# Patient Record
Sex: MALE | Race: BLACK OR AFRICAN AMERICAN | NOT HISPANIC OR LATINO
[De-identification: names, ages, dates, MRNs, and addresses within clinical notes are randomized per-mention and may not be internally consistent; named-entity substitution may affect disease eponyms.]

---

## 2017-01-12 ENCOUNTER — APPOINTMENT (OUTPATIENT)
Dept: CARDIOLOGY | Facility: CLINIC | Age: 82
End: 2017-01-12

## 2017-01-12 VITALS
RESPIRATION RATE: 16 BRPM | DIASTOLIC BLOOD PRESSURE: 62 MMHG | SYSTOLIC BLOOD PRESSURE: 122 MMHG | BODY MASS INDEX: 20.66 KG/M2 | HEIGHT: 65 IN | HEART RATE: 62 BPM | WEIGHT: 124 LBS | OXYGEN SATURATION: 98 %

## 2017-01-19 ENCOUNTER — APPOINTMENT (OUTPATIENT)
Dept: VASCULAR SURGERY | Facility: CLINIC | Age: 82
End: 2017-01-19

## 2017-01-19 VITALS
WEIGHT: 124 LBS | SYSTOLIC BLOOD PRESSURE: 132 MMHG | HEIGHT: 65 IN | TEMPERATURE: 97.9 F | HEART RATE: 60 BPM | DIASTOLIC BLOOD PRESSURE: 77 MMHG | BODY MASS INDEX: 20.66 KG/M2

## 2017-01-19 DIAGNOSIS — I50.33 ACUTE ON CHRONIC DIASTOLIC (CONGESTIVE) HEART FAILURE: ICD-10-CM

## 2017-01-31 ENCOUNTER — OUTPATIENT (OUTPATIENT)
Dept: OUTPATIENT SERVICES | Facility: HOSPITAL | Age: 82
LOS: 1 days | End: 2017-01-31
Payer: MEDICARE

## 2017-01-31 VITALS
HEIGHT: 65 IN | WEIGHT: 121.03 LBS | SYSTOLIC BLOOD PRESSURE: 128 MMHG | RESPIRATION RATE: 20 BRPM | DIASTOLIC BLOOD PRESSURE: 60 MMHG | TEMPERATURE: 97 F | HEART RATE: 64 BPM

## 2017-01-31 DIAGNOSIS — I50.9 HEART FAILURE, UNSPECIFIED: ICD-10-CM

## 2017-01-31 DIAGNOSIS — Z98.89 OTHER SPECIFIED POSTPROCEDURAL STATES: Chronic | ICD-10-CM

## 2017-01-31 DIAGNOSIS — N18.9 CHRONIC KIDNEY DISEASE, UNSPECIFIED: Chronic | ICD-10-CM

## 2017-01-31 DIAGNOSIS — N18.4 CHRONIC KIDNEY DISEASE, STAGE 4 (SEVERE): ICD-10-CM

## 2017-01-31 DIAGNOSIS — N18.9 CHRONIC KIDNEY DISEASE, UNSPECIFIED: ICD-10-CM

## 2017-01-31 DIAGNOSIS — R06.83 SNORING: ICD-10-CM

## 2017-01-31 DIAGNOSIS — H26.9 UNSPECIFIED CATARACT: Chronic | ICD-10-CM

## 2017-01-31 LAB
BASOPHILS # BLD AUTO: 0.02 K/UL — SIGNIFICANT CHANGE UP (ref 0–0.2)
BASOPHILS NFR BLD AUTO: 0.5 % — SIGNIFICANT CHANGE UP (ref 0–2)
BUN SERPL-MCNC: 37 MG/DL — HIGH (ref 7–23)
CALCIUM SERPL-MCNC: 8.4 MG/DL — SIGNIFICANT CHANGE UP (ref 8.4–10.5)
CHLORIDE SERPL-SCNC: 96 MMOL/L — LOW (ref 98–107)
CO2 SERPL-SCNC: 26 MMOL/L — SIGNIFICANT CHANGE UP (ref 22–31)
CREAT SERPL-MCNC: 8.23 MG/DL — HIGH (ref 0.5–1.3)
EOSINOPHIL # BLD AUTO: 0.17 K/UL — SIGNIFICANT CHANGE UP (ref 0–0.5)
EOSINOPHIL NFR BLD AUTO: 4.2 % — SIGNIFICANT CHANGE UP (ref 0–6)
GLUCOSE SERPL-MCNC: 95 MG/DL — SIGNIFICANT CHANGE UP (ref 70–99)
HCT VFR BLD CALC: 34.6 % — LOW (ref 39–50)
HGB BLD-MCNC: 10.6 G/DL — LOW (ref 13–17)
IMM GRANULOCYTES NFR BLD AUTO: 0 % — SIGNIFICANT CHANGE UP (ref 0–1.5)
LYMPHOCYTES # BLD AUTO: 0.7 K/UL — LOW (ref 1–3.3)
LYMPHOCYTES # BLD AUTO: 17.5 % — SIGNIFICANT CHANGE UP (ref 13–44)
MCHC RBC-ENTMCNC: 27 PG — SIGNIFICANT CHANGE UP (ref 27–34)
MCHC RBC-ENTMCNC: 30.6 % — LOW (ref 32–36)
MCV RBC AUTO: 88 FL — SIGNIFICANT CHANGE UP (ref 80–100)
MONOCYTES # BLD AUTO: 0.62 K/UL — SIGNIFICANT CHANGE UP (ref 0–0.9)
MONOCYTES NFR BLD AUTO: 15.5 % — HIGH (ref 2–14)
NEUTROPHILS # BLD AUTO: 2.5 K/UL — SIGNIFICANT CHANGE UP (ref 1.8–7.4)
NEUTROPHILS NFR BLD AUTO: 62.3 % — SIGNIFICANT CHANGE UP (ref 43–77)
PLATELET # BLD AUTO: 141 K/UL — LOW (ref 150–400)
PMV BLD: 10.7 FL — SIGNIFICANT CHANGE UP (ref 7–13)
POTASSIUM SERPL-MCNC: 4.1 MMOL/L — SIGNIFICANT CHANGE UP (ref 3.5–5.3)
POTASSIUM SERPL-SCNC: 4.1 MMOL/L — SIGNIFICANT CHANGE UP (ref 3.5–5.3)
RBC # BLD: 3.93 M/UL — LOW (ref 4.2–5.8)
RBC # FLD: 18 % — HIGH (ref 10.3–14.5)
SODIUM SERPL-SCNC: 143 MMOL/L — SIGNIFICANT CHANGE UP (ref 135–145)
WBC # BLD: 4.01 K/UL — SIGNIFICANT CHANGE UP (ref 3.8–10.5)
WBC # FLD AUTO: 4.01 K/UL — SIGNIFICANT CHANGE UP (ref 3.8–10.5)

## 2017-01-31 PROCEDURE — 93010 ELECTROCARDIOGRAM REPORT: CPT

## 2017-01-31 RX ORDER — SODIUM CHLORIDE 9 MG/ML
1000 INJECTION INTRAMUSCULAR; INTRAVENOUS; SUBCUTANEOUS
Qty: 0 | Refills: 0 | Status: DISCONTINUED | OUTPATIENT
Start: 2017-02-07 | End: 2017-02-22

## 2017-01-31 NOTE — H&P PST ADULT - NEGATIVE ENMT SYMPTOMS
no vertigo/no hearing difficulty/no tinnitus no tinnitus/no hearing difficulty/no vertigo/no ear pain

## 2017-01-31 NOTE — H&P PST ADULT - PMH
AAA (abdominal aortic aneurysm)    Aortic dissection    BPH (benign prostatic hypertrophy)    CHF (Congestive Heart Failure)    CKD (chronic kidney disease)    Essential Hypertension    Intracranial Subdural Hematoma  1991 - no deficits  Mitral valve disorder    Osteoarthritis  left knee.  Pacemaker  10/2010, BIV ICD upgrade in 9/2015  PHT (Pulmonary Hypertension)    Renal Carcinoma

## 2017-01-31 NOTE — H&P PST ADULT - NSANTHOSAYNRD_GEN_A_CORE
never tested/No. ISMAEL screening performed.  STOP BANG Legend: 0-2 = LOW Risk; 3-4 = INTERMEDIATE Risk; 5-8 = HIGH Risk

## 2017-01-31 NOTE — H&P PST ADULT - PROBLEM SELECTOR PLAN 1
scheduled for superficialization of right AV fistula on 2/7/17.   labs pending  Preop instructions provided to pt.

## 2017-01-31 NOTE — H&P PST ADULT - NEGATIVE OPHTHALMOLOGIC SYMPTOMS
no blurred vision L/no photophobia/no diplopia/no blurred vision R no photophobia/no lacrimation R/no diplopia/no blurred vision R/no lacrimation L/no blurred vision L

## 2017-01-31 NOTE — H&P PST ADULT - RS GEN PE MLT RESP DETAILS PC
airway patent/respirations non-labored/good air movement/breath sounds equal/clear to auscultation bilaterally respirations non-labored/airway patent/no wheezes/good air movement/no rales/clear to auscultation bilaterally/no rhonchi/breath sounds equal

## 2017-01-31 NOTE — H&P PST ADULT - LYMPHATIC
supraclavicular L/posterior cervical R/anterior cervical R/posterior cervical L/supraclavicular R/anterior cervical L

## 2017-01-31 NOTE — H&P PST ADULT - PSH
Cataract, bilateral  right 3/2015, right  5/2015  Chronic kidney disease (CKD)  pt s/p Right AVF, s/p Right First stage basilic vein transposition  Chronic kidney disease (CKD)  s/p Right AVF 2/16, 4/16  H/O aortic aneurysm repair  2013  History of cystoscopy  cryoablation  of prostate 2014  Mitral valve replaced  Bioprosthetic - Dr. Roy 2010  Pacemaker  2010 [employer] model #2210, with upgrade to AICD in 2015 [employer] model #[id number]  S/P Craniotomy  1991  S/P Nephrectomy  right partial nephrectomy 2006 Cataract, bilateral  right 3/2015, right  5/2015  Chronic kidney disease (CKD)  s/p Right AVF 2/16, 4/16  Chronic kidney disease (CKD)  pt s/p Right AVF, s/p Right First stage basilic vein transposition  CRF (chronic renal failure)  AV fistula right upper arm inserted in 2016 May  H/O aortic aneurysm repair  2013  History of cystoscopy  cryoablation  of prostate 2014  Mitral valve replaced  Bioprosthetic - Dr. Roy 2010  Pacemaker  2010 [employer] model #2210, with upgrade to AICD in 2015 [employer] model #[id number]  S/P Craniotomy  1991  S/P Nephrectomy  right partial nephrectomy 2006

## 2017-02-06 NOTE — ASU PATIENT PROFILE, ADULT - PSH
Cataract, bilateral  right 3/2015, right  5/2015  Chronic kidney disease (CKD)  pt s/p Right AVF, s/p Right First stage basilic vein transposition  Chronic kidney disease (CKD)  s/p Right AVF 2/16, 4/16  H/O aortic aneurysm repair  2013  History of cystoscopy  cryoablation  of prostate 2014  Mitral valve replaced  Bioprosthetic - Dr. Roy 2010  Pacemaker  2010 [employer] model #2210, with upgrade to AICD in 2015 [employer] model #[id number]  S/P Craniotomy  1991  S/P Nephrectomy  right partial nephrectomy 2006

## 2017-02-06 NOTE — ASU PATIENT PROFILE, ADULT - VISION (WITH CORRECTIVE LENSES IF THE PATIENT USUALLY WEARS THEM):
Normal vision: sees adequately in most situations; can see medication labels, newsprint reading glasses/Normal vision: sees adequately in most situations; can see medication labels, newsprint

## 2017-02-07 ENCOUNTER — OUTPATIENT (OUTPATIENT)
Dept: OUTPATIENT SERVICES | Facility: HOSPITAL | Age: 82
LOS: 1 days | Discharge: HOME | End: 2017-02-07
Payer: MEDICARE

## 2017-02-07 ENCOUNTER — APPOINTMENT (OUTPATIENT)
Dept: VASCULAR SURGERY | Facility: HOSPITAL | Age: 82
End: 2017-02-07

## 2017-02-07 VITALS
SYSTOLIC BLOOD PRESSURE: 127 MMHG | OXYGEN SATURATION: 96 % | HEART RATE: 61 BPM | DIASTOLIC BLOOD PRESSURE: 58 MMHG | WEIGHT: 121.03 LBS | TEMPERATURE: 98 F | RESPIRATION RATE: 16 BRPM | HEIGHT: 65 IN

## 2017-02-07 VITALS
TEMPERATURE: 97 F | OXYGEN SATURATION: 95 % | HEART RATE: 61 BPM | DIASTOLIC BLOOD PRESSURE: 70 MMHG | RESPIRATION RATE: 16 BRPM | SYSTOLIC BLOOD PRESSURE: 126 MMHG

## 2017-02-07 DIAGNOSIS — N18.9 CHRONIC KIDNEY DISEASE, UNSPECIFIED: Chronic | ICD-10-CM

## 2017-02-07 DIAGNOSIS — H26.9 UNSPECIFIED CATARACT: Chronic | ICD-10-CM

## 2017-02-07 DIAGNOSIS — Z98.89 OTHER SPECIFIED POSTPROCEDURAL STATES: Chronic | ICD-10-CM

## 2017-02-07 DIAGNOSIS — N18.4 CHRONIC KIDNEY DISEASE, STAGE 4 (SEVERE): ICD-10-CM

## 2017-02-07 LAB — POTASSIUM BLDV-SCNC: 4.3 MMOL/L — SIGNIFICANT CHANGE UP (ref 3.4–4.5)

## 2017-02-07 PROCEDURE — 36832 AV FISTULA REVISION OPEN: CPT

## 2017-02-07 PROCEDURE — 93287 PERI-PX DEVICE EVAL & PRGR: CPT | Mod: 26

## 2017-02-07 RX ORDER — ACETAMINOPHEN 500 MG
650 TABLET ORAL EVERY 6 HOURS
Qty: 0 | Refills: 0 | Status: DISCONTINUED | OUTPATIENT
Start: 2017-02-07 | End: 2017-02-22

## 2017-02-07 RX ORDER — ACETAMINOPHEN 500 MG
2 TABLET ORAL
Qty: 0 | Refills: 0 | COMMUNITY
Start: 2017-02-07

## 2017-02-07 NOTE — ASU DISCHARGE PLAN (ADULT/PEDIATRIC). - MEDICATION SUMMARY - MEDICATIONS TO TAKE
I will START or STAY ON the medications listed below when I get home from the hospital:    MVI 1  tab orally daily  last dose 1/30  --     -- Indication: For home med    Vitamin B 100 mg orally daily  --     -- Indication: For home med    Tumeric 1 tab orally daily  --     -- Indication: For home med    acetaminophen 325 mg oral tablet  -- 2 tab(s) by mouth every 6 hours, As needed, Moderate Pain (4 - 6)  -- Indication: For pain    aspirin 81 mg oral tablet  -- 1 tab(s) by mouth once a day in  am  -- Indication: For home med    labetalol 200 mg oral tablet  -- 1 tab(s) by mouth 2 times a day  -- Indication: For home med

## 2017-02-07 NOTE — ASU DISCHARGE PLAN (ADULT/PEDIATRIC). - NOTIFY
Bleeding that does not stop Fever greater than 101/Inability to Tolerate Liquids or Foods/Persistent Nausea and Vomiting/Bleeding that does not stop

## 2017-02-07 NOTE — ASU DISCHARGE PLAN (ADULT/PEDIATRIC). - CONDITIONS AT DISCHARGE
Verbalizing good understanding of discharge instructions and medication review.Discharge criteria met.  Cleared for discharge home by anesthesia.  Escorted to entrance  discharged home.

## 2017-02-08 ENCOUNTER — EMERGENCY (EMERGENCY)
Facility: HOSPITAL | Age: 82
LOS: 1 days | Discharge: HOME | End: 2017-02-08
Attending: EMERGENCY MEDICINE | Admitting: EMERGENCY MEDICINE
Payer: MEDICARE

## 2017-02-08 VITALS
DIASTOLIC BLOOD PRESSURE: 70 MMHG | OXYGEN SATURATION: 100 % | SYSTOLIC BLOOD PRESSURE: 151 MMHG | HEART RATE: 64 BPM | TEMPERATURE: 99 F | RESPIRATION RATE: 16 BRPM

## 2017-02-08 DIAGNOSIS — N18.9 CHRONIC KIDNEY DISEASE, UNSPECIFIED: Chronic | ICD-10-CM

## 2017-02-08 DIAGNOSIS — Z98.89 OTHER SPECIFIED POSTPROCEDURAL STATES: Chronic | ICD-10-CM

## 2017-02-08 DIAGNOSIS — H26.9 UNSPECIFIED CATARACT: Chronic | ICD-10-CM

## 2017-02-08 PROCEDURE — 99283 EMERGENCY DEPT VISIT LOW MDM: CPT

## 2017-02-08 NOTE — ED ADULT TRIAGE NOTE - CHIEF COMPLAINT QUOTE
C/O post op site bleeding,  HX R AV Fistula since April, Yesterday had a review of the R AV Fistula, dc'd w/ bandage to site - bled last night through bandage then stopped. Went to HD Today and they redressed it.  Pt observed w/ some bleeding on bandage, not saturated. PMH, HTN, Mitral Valve replace, Aortic Dissection, ESRD HD M,W, F - completed HD today, on ASA 81mg daily. Denies pain or other medical complaint. C/O post op site bleeding,  HX R AV Fistula since April, Yesterday had a review of the R AV Fistula, dc'd w/ bandage to site - bled last night through bandage then stopped. Went to HD Today and they redressed it.  Pt observed w/ some bleeding on bandage, not saturated. PMH, HTN, Mitral Valve replace, Aortic Dissection, ESRD HD M,W, F - completed HD today, on ASA 81mg daily. Denies pain or other medical complaint.    Addendum - pt c/o bleeding to R AVFistula again - bandages observed w/ increased fresh blood. Observed small opening to suture line, small amt active bleeding present, pressure dressing applied, pt denies pain, in NAD at this time.

## 2017-02-09 VITALS
SYSTOLIC BLOOD PRESSURE: 142 MMHG | RESPIRATION RATE: 14 BRPM | HEART RATE: 60 BPM | DIASTOLIC BLOOD PRESSURE: 55 MMHG | TEMPERATURE: 98 F | OXYGEN SATURATION: 100 %

## 2017-02-09 NOTE — ED PROVIDER NOTE - PHYSICAL EXAMINATION
The fistula site in the LUE has a palpable thrill.  There is also some slow oozing of blood from the proximal portion of the fistula in the region of the sutures.

## 2017-02-09 NOTE — ED PROVIDER NOTE - PSH
Cataract, bilateral  right 3/2015, right  5/2015  Chronic kidney disease (CKD)  s/p Right AVF 2/16, 4/16  Chronic kidney disease (CKD)  pt s/p Right AVF, s/p Right First stage basilic vein transposition  CRF (chronic renal failure)  AV fistula right upper arm inserted in 2016 May  H/O aortic aneurysm repair  2013  History of cystoscopy  cryoablation  of prostate 2014  Mitral valve replaced  Bioprosthetic - Dr. Roy 2010  Pacemaker  2010 [employer] model #2210, with upgrade to AICD in 2015 [employer] model #[id number]  S/P Craniotomy  1991  S/P Nephrectomy  right partial nephrectomy 2006

## 2017-02-09 NOTE — ED PROVIDER NOTE - OBJECTIVE STATEMENT
81 yo M with ESRD with a recent fistula revision by Dr Collazo 1 day ago on the 7th to the proximal portion of the fistula.  There was some post op bleeding last night.  This morning went to HD and they used the fistula (distal portion) for HD and there was some minor bleeding to the proximal portion.  Tonight started bleeding again and soaked dressings and came to the ED.  There is no pain and no signs of symptomatic anemia,  Pt feels well otherwise

## 2017-02-09 NOTE — ED ADULT NURSE NOTE - PSH
Cataract, bilateral  right 3/2015, right  5/2015  Chronic kidney disease (CKD)  s/p Right AVF 2/16, 4/16  Chronic kidney disease (CKD)  pt s/p Right AVF, s/p Right First stage basilic vein transposition  CRF (chronic renal failure)  AV fistula right upper arm inserted in 2016 May  H/O aortic aneurysm repair  2013  History of cystoscopy  cryoablation  of prostate 2014  Mitral valve replaced  Bioprosthetic - Dr. Roy 2010  Pacemaker  2010 [employer] model #2210, with upgrade to AICD in 2015 [employer] model #[id number]  S/P Craniotomy  1991  S/P Nephrectomy  right partial nephrectomy 2006

## 2017-02-09 NOTE — ED PROVIDER NOTE - PROGRESS NOTE DETAILS
surgery consulted to see patient Surgery evaluated the patient placed pressure dressing will reevaluate in 30 minutes. No massive arterial bleed.  Persistent oozing.  Treat symptomatically and reassess.

## 2017-02-09 NOTE — ED PROVIDER NOTE - MEDICAL DECISION MAKING DETAILS
pt presents with bleeding post op.  Slow oozing.  Surgery consulted as was done here.  They will stitch and surgicel and can follow up outpatient.  No signs of anemia and not significant bleeding so no labs at this time

## 2017-02-09 NOTE — ED ADULT NURSE NOTE - CHIEF COMPLAINT QUOTE
C/O post op site bleeding,  HX R AV Fistula since April, Yesterday had a review of the R AV Fistula, dc'd w/ bandage to site - bled last night through bandage then stopped. Went to HD Today and they redressed it.  Pt observed w/ some bleeding on bandage, not saturated. PMH, HTN, Mitral Valve replace, Aortic Dissection, ESRD HD M,W, F - completed HD today, on ASA 81mg daily. Denies pain or other medical complaint.    Addendum - pt c/o bleeding to R AVFistula again - bandages observed w/ increased fresh blood. Observed small opening to suture line, small amt active bleeding present, pressure dressing applied, pt denies pain, in NAD at this time.

## 2017-02-10 NOTE — ED ADULT NURSE NOTE - OBJECTIVE STATEMENT
Pt received to room1 A&Ox3 c/o bleeding from R AV fistula site since last night. Pt states fistula revision X1day ago- had post-op bleeding last night. Pt went for dialysis today, distal part of fistula was accessed with some bleeding noted from proximal portion of fistula after dialysis and again tonight. Pt soaked through two gauze dressings tonight. Dressing removed and minor active bleeding noted to site. MD at bedside, new pressure dressing applied. Pt appears comfortably in stretcher, respirations even and unlabored, nad noted at this time. Denies dizziness, lightheadedness, chest pain, SOB, n/v at this time. PMH anemia. Will monitor patient closely. Have Your Skin Lesions Been Treated?: not been treated Is This A New Presentation, Or A Follow-Up?: Skin Lesions How Severe Is Your Skin Lesion?: mild

## 2017-02-15 ENCOUNTER — TRANSCRIPTION ENCOUNTER (OUTPATIENT)
Age: 82
End: 2017-02-15

## 2017-02-16 ENCOUNTER — APPOINTMENT (OUTPATIENT)
Dept: VASCULAR SURGERY | Facility: CLINIC | Age: 82
End: 2017-02-16

## 2017-02-16 VITALS
BODY MASS INDEX: 20.66 KG/M2 | WEIGHT: 124 LBS | HEART RATE: 64 BPM | DIASTOLIC BLOOD PRESSURE: 58 MMHG | TEMPERATURE: 98.3 F | HEIGHT: 65 IN | SYSTOLIC BLOOD PRESSURE: 134 MMHG

## 2017-02-23 ENCOUNTER — APPOINTMENT (OUTPATIENT)
Dept: VASCULAR SURGERY | Facility: CLINIC | Age: 82
End: 2017-02-23

## 2017-02-23 VITALS
WEIGHT: 126 LBS | HEIGHT: 65 IN | SYSTOLIC BLOOD PRESSURE: 112 MMHG | TEMPERATURE: 97.9 F | HEART RATE: 66 BPM | DIASTOLIC BLOOD PRESSURE: 63 MMHG | BODY MASS INDEX: 20.99 KG/M2

## 2017-03-07 ENCOUNTER — APPOINTMENT (OUTPATIENT)
Dept: ELECTROPHYSIOLOGY | Facility: CLINIC | Age: 82
End: 2017-03-07

## 2017-03-23 ENCOUNTER — APPOINTMENT (OUTPATIENT)
Dept: VASCULAR SURGERY | Facility: CLINIC | Age: 82
End: 2017-03-23

## 2017-03-23 VITALS
DIASTOLIC BLOOD PRESSURE: 59 MMHG | WEIGHT: 126 LBS | TEMPERATURE: 97.8 F | HEIGHT: 65 IN | SYSTOLIC BLOOD PRESSURE: 124 MMHG | HEART RATE: 60 BPM | BODY MASS INDEX: 20.99 KG/M2

## 2017-03-23 DIAGNOSIS — Z09 ENCOUNTER FOR FOLLOW-UP EXAMINATION AFTER COMPLETED TREATMENT FOR CONDITIONS OTHER THAN MALIGNANT NEOPLASM: ICD-10-CM

## 2017-03-27 PROBLEM — Z09 POSTOP CHECK: Status: ACTIVE | Noted: 2017-02-16

## 2017-04-13 ENCOUNTER — APPOINTMENT (OUTPATIENT)
Dept: CARDIOLOGY | Facility: CLINIC | Age: 82
End: 2017-04-13

## 2017-04-13 ENCOUNTER — NON-APPOINTMENT (OUTPATIENT)
Age: 82
End: 2017-04-13

## 2017-04-13 VITALS
HEART RATE: 64 BPM | OXYGEN SATURATION: 97 % | HEIGHT: 65 IN | RESPIRATION RATE: 18 BRPM | TEMPERATURE: 98.1 F | SYSTOLIC BLOOD PRESSURE: 137 MMHG | DIASTOLIC BLOOD PRESSURE: 73 MMHG | WEIGHT: 126 LBS | BODY MASS INDEX: 20.99 KG/M2

## 2017-04-13 RX ORDER — METOPROLOL TARTRATE 50 MG/1
50 TABLET, FILM COATED ORAL
Qty: 60 | Refills: 0 | Status: DISCONTINUED | COMMUNITY
Start: 2017-01-12 | End: 2017-04-13

## 2017-04-13 RX ORDER — LABETALOL HYDROCHLORIDE 200 MG/1
200 TABLET, FILM COATED ORAL 3 TIMES DAILY
Qty: 90 | Refills: 0 | Status: ACTIVE | COMMUNITY

## 2017-05-23 ENCOUNTER — OUTPATIENT (OUTPATIENT)
Dept: OUTPATIENT SERVICES | Facility: HOSPITAL | Age: 82
LOS: 1 days | Discharge: HOME | End: 2017-05-23
Payer: MEDICARE

## 2017-05-23 ENCOUNTER — APPOINTMENT (OUTPATIENT)
Dept: VASCULAR SURGERY | Facility: HOSPITAL | Age: 82
End: 2017-05-23

## 2017-05-23 DIAGNOSIS — Z98.89 OTHER SPECIFIED POSTPROCEDURAL STATES: Chronic | ICD-10-CM

## 2017-05-23 DIAGNOSIS — N18.9 CHRONIC KIDNEY DISEASE, UNSPECIFIED: Chronic | ICD-10-CM

## 2017-05-23 DIAGNOSIS — N19 UNSPECIFIED KIDNEY FAILURE: ICD-10-CM

## 2017-05-23 DIAGNOSIS — N18.6 END STAGE RENAL DISEASE: ICD-10-CM

## 2017-05-23 DIAGNOSIS — H26.9 UNSPECIFIED CATARACT: Chronic | ICD-10-CM

## 2017-05-23 PROCEDURE — 36902 INTRO CATH DIALYSIS CIRCUIT: CPT | Mod: RT

## 2017-05-23 PROCEDURE — 76937 US GUIDE VASCULAR ACCESS: CPT | Mod: 26

## 2017-05-23 PROCEDURE — 36907 BALO ANGIOP CTR DIALYSIS SEG: CPT | Mod: RT

## 2017-05-23 RX ORDER — LABETALOL HCL 100 MG
1 TABLET ORAL
Qty: 0 | Refills: 0 | COMMUNITY

## 2017-05-23 RX ORDER — SODIUM CHLORIDE 9 MG/ML
3 INJECTION INTRAMUSCULAR; INTRAVENOUS; SUBCUTANEOUS EVERY 8 HOURS
Qty: 0 | Refills: 0 | Status: DISCONTINUED | OUTPATIENT
Start: 2017-05-23 | End: 2017-06-07

## 2017-05-23 RX ORDER — ASPIRIN/CALCIUM CARB/MAGNESIUM 324 MG
1 TABLET ORAL
Qty: 0 | Refills: 0 | COMMUNITY

## 2017-05-23 RX ADMIN — SODIUM CHLORIDE 3 MILLILITER(S): 9 INJECTION INTRAMUSCULAR; INTRAVENOUS; SUBCUTANEOUS at 15:41

## 2017-05-23 NOTE — H&P CARDIOLOGY - FAMILY HISTORY
<<-----Click on this checkbox to enter Family History Family history of CVA     Sibling  Still living? Unknown  Diabetes mellitus, Age at diagnosis: Age Unknown  Hypertension, Age at diagnosis: Age Unknown  Family history of heart failure, Age at diagnosis: Age Unknown  Family history of stroke, Age at diagnosis: Age Unknown  Family history of aortic dissection, Age at diagnosis: Age Unknown

## 2017-05-23 NOTE — H&P CARDIOLOGY - PSH
Cataract, bilateral  right 3/2015, right  5/2015  Chronic kidney disease (CKD)  s/p Right AVF 2/16, 4/16  Chronic kidney disease (CKD)  pt s/p Right AVF, s/p Right First stage basilic vein transposition  CRF (chronic renal failure)  AV fistula right upper arm inserted in 2016 May  H/O aortic aneurysm repair  2013  History of cystoscopy  cryoablation  of prostate 2014  Mitral valve replaced  Bioprosthetic - Dr. Roy 2010  Pacemaker  2010 [employer] model #2210, with upgrade to AICD in 2015 [employer] model #[id number]  S/P Craniotomy  1991  S/P Nephrectomy  right partial nephrectomy 2006

## 2017-05-23 NOTE — H&P CARDIOLOGY - RS GEN PE MLT RESP DETAILS PC
no rales/clear to auscultation bilaterally/good air movement/breath sounds equal/respirations non-labored/airway patent/no wheezes/no rhonchi

## 2017-05-23 NOTE — H&P CARDIOLOGY - HISTORY OF PRESENT ILLNESS
82 year old male with HTN, HD (M/W/F - [facility] [place]) s/p     Superficialization of right arm AVF who presents for repeat right AV     fistulogram.  no complaints

## 2017-06-13 ENCOUNTER — APPOINTMENT (OUTPATIENT)
Dept: ELECTROPHYSIOLOGY | Facility: CLINIC | Age: 82
End: 2017-06-13

## 2017-06-22 ENCOUNTER — FORM ENCOUNTER (OUTPATIENT)
Age: 82
End: 2017-06-22

## 2017-06-23 ENCOUNTER — APPOINTMENT (OUTPATIENT)
Age: 82
End: 2017-06-23

## 2017-07-05 ENCOUNTER — FORM ENCOUNTER (OUTPATIENT)
Age: 82
End: 2017-07-05

## 2017-07-06 ENCOUNTER — APPOINTMENT (OUTPATIENT)
Age: 82
End: 2017-07-06

## 2017-08-03 ENCOUNTER — APPOINTMENT (OUTPATIENT)
Dept: CARDIOLOGY | Facility: CLINIC | Age: 82
End: 2017-08-03

## 2017-08-08 ENCOUNTER — FORM ENCOUNTER (OUTPATIENT)
Age: 82
End: 2017-08-08

## 2017-08-09 ENCOUNTER — APPOINTMENT (OUTPATIENT)
Age: 82
End: 2017-08-09
Payer: MEDICARE

## 2017-08-09 PROCEDURE — 36902Z: CUSTOM

## 2017-08-09 PROCEDURE — 36907Z: CUSTOM | Mod: 59

## 2017-08-10 ENCOUNTER — APPOINTMENT (OUTPATIENT)
Dept: ELECTROPHYSIOLOGY | Facility: CLINIC | Age: 82
End: 2017-08-10
Payer: MEDICARE

## 2017-08-10 ENCOUNTER — NON-APPOINTMENT (OUTPATIENT)
Age: 82
End: 2017-08-10

## 2017-08-10 ENCOUNTER — APPOINTMENT (OUTPATIENT)
Dept: CARDIOLOGY | Facility: CLINIC | Age: 82
End: 2017-08-10
Payer: MEDICARE

## 2017-08-10 VITALS
BODY MASS INDEX: 19.83 KG/M2 | HEART RATE: 66 BPM | OXYGEN SATURATION: 98 % | HEIGHT: 65 IN | DIASTOLIC BLOOD PRESSURE: 58 MMHG | SYSTOLIC BLOOD PRESSURE: 135 MMHG | WEIGHT: 119 LBS

## 2017-08-10 PROCEDURE — 99214 OFFICE O/P EST MOD 30 MIN: CPT

## 2017-08-10 PROCEDURE — 93000 ELECTROCARDIOGRAM COMPLETE: CPT

## 2017-08-10 PROCEDURE — 93284 PRGRMG EVAL IMPLANTABLE DFB: CPT

## 2017-09-12 ENCOUNTER — FORM ENCOUNTER (OUTPATIENT)
Age: 82
End: 2017-09-12

## 2017-09-13 ENCOUNTER — APPOINTMENT (OUTPATIENT)
Age: 82
End: 2017-09-13
Payer: MEDICARE

## 2017-09-13 PROCEDURE — 36902Z: CUSTOM

## 2017-09-13 PROCEDURE — 36907Z: CUSTOM | Mod: 59

## 2017-09-28 ENCOUNTER — APPOINTMENT (OUTPATIENT)
Dept: VASCULAR SURGERY | Facility: CLINIC | Age: 82
End: 2017-09-28
Payer: MEDICARE

## 2017-09-28 VITALS
TEMPERATURE: 98 F | HEIGHT: 65 IN | HEART RATE: 60 BPM | DIASTOLIC BLOOD PRESSURE: 71 MMHG | SYSTOLIC BLOOD PRESSURE: 125 MMHG | WEIGHT: 119 LBS | BODY MASS INDEX: 19.83 KG/M2

## 2017-09-28 DIAGNOSIS — I77.0 ARTERIOVENOUS FISTULA, ACQUIRED: ICD-10-CM

## 2017-09-28 PROCEDURE — 99214 OFFICE O/P EST MOD 30 MIN: CPT

## 2017-10-02 PROBLEM — I77.0 AV FISTULA: Status: ACTIVE | Noted: 2017-03-27

## 2017-10-11 ENCOUNTER — APPOINTMENT (OUTPATIENT)
Age: 82
End: 2017-10-11

## 2017-11-02 ENCOUNTER — NON-APPOINTMENT (OUTPATIENT)
Age: 82
End: 2017-11-02

## 2017-11-02 VITALS
DIASTOLIC BLOOD PRESSURE: 73 MMHG | RESPIRATION RATE: 15 BRPM | TEMPERATURE: 98 F | WEIGHT: 122 LBS | BODY MASS INDEX: 20.33 KG/M2 | HEART RATE: 62 BPM | SYSTOLIC BLOOD PRESSURE: 134 MMHG | OXYGEN SATURATION: 99 % | HEIGHT: 65 IN

## 2017-11-02 RX ORDER — MULTIVITAMIN
TABLET ORAL DAILY
Refills: 0 | Status: ACTIVE | COMMUNITY

## 2017-11-02 RX ORDER — CALCITRIOL 0.5 UG/1
0.5 CAPSULE, LIQUID FILLED ORAL
Refills: 0 | Status: ACTIVE | COMMUNITY

## 2017-11-13 ENCOUNTER — APPOINTMENT (OUTPATIENT)
Dept: ELECTROPHYSIOLOGY | Facility: CLINIC | Age: 82
End: 2017-11-13
Payer: MEDICARE

## 2017-11-13 PROCEDURE — 93296 REM INTERROG EVL PM/IDS: CPT

## 2017-11-13 PROCEDURE — 93295 DEV INTERROG REMOTE 1/2/MLT: CPT

## 2017-11-14 ENCOUNTER — FORM ENCOUNTER (OUTPATIENT)
Age: 82
End: 2017-11-14

## 2017-11-15 ENCOUNTER — APPOINTMENT (OUTPATIENT)
Age: 82
End: 2017-11-15
Payer: MEDICARE

## 2017-11-15 PROCEDURE — 36908Z: CUSTOM

## 2017-11-15 PROCEDURE — 36215Z: CUSTOM | Mod: 59

## 2017-11-15 PROCEDURE — 36902Z: CUSTOM | Mod: 59

## 2017-11-26 ENCOUNTER — INPATIENT (INPATIENT)
Facility: HOSPITAL | Age: 82
LOS: 2 days | Discharge: HOME | End: 2017-11-29
Attending: SURGERY | Admitting: SURGERY
Payer: MEDICARE

## 2017-11-26 VITALS
HEART RATE: 59 BPM | DIASTOLIC BLOOD PRESSURE: 61 MMHG | SYSTOLIC BLOOD PRESSURE: 170 MMHG | OXYGEN SATURATION: 99 % | RESPIRATION RATE: 18 BRPM | TEMPERATURE: 98 F

## 2017-11-26 DIAGNOSIS — I71.9 AORTIC ANEURYSM OF UNSPECIFIED SITE, WITHOUT RUPTURE: ICD-10-CM

## 2017-11-26 DIAGNOSIS — N18.9 CHRONIC KIDNEY DISEASE, UNSPECIFIED: Chronic | ICD-10-CM

## 2017-11-26 DIAGNOSIS — Z98.89 OTHER SPECIFIED POSTPROCEDURAL STATES: Chronic | ICD-10-CM

## 2017-11-26 DIAGNOSIS — H26.9 UNSPECIFIED CATARACT: Chronic | ICD-10-CM

## 2017-11-26 LAB
ALBUMIN SERPL ELPH-MCNC: 4 G/DL — SIGNIFICANT CHANGE UP (ref 3.3–5)
ALP SERPL-CCNC: 63 U/L — SIGNIFICANT CHANGE UP (ref 40–120)
ALT FLD-CCNC: 4 U/L — SIGNIFICANT CHANGE UP (ref 4–41)
APPEARANCE UR: CLEAR — SIGNIFICANT CHANGE UP
APTT BLD: 27.7 SEC — SIGNIFICANT CHANGE UP (ref 27.5–37.4)
APTT BLD: 28.6 SEC — SIGNIFICANT CHANGE UP (ref 27.5–37.4)
AST SERPL-CCNC: 9 U/L — SIGNIFICANT CHANGE UP (ref 4–40)
BASOPHILS # BLD AUTO: 0.01 K/UL — SIGNIFICANT CHANGE UP (ref 0–0.2)
BASOPHILS # BLD AUTO: 0.01 K/UL — SIGNIFICANT CHANGE UP (ref 0–0.2)
BASOPHILS NFR BLD AUTO: 0.2 % — SIGNIFICANT CHANGE UP (ref 0–2)
BASOPHILS NFR BLD AUTO: 0.3 % — SIGNIFICANT CHANGE UP (ref 0–2)
BILIRUB SERPL-MCNC: 0.8 MG/DL — SIGNIFICANT CHANGE UP (ref 0.2–1.2)
BILIRUB UR-MCNC: NEGATIVE — SIGNIFICANT CHANGE UP
BLD GP AB SCN SERPL QL: POSITIVE — SIGNIFICANT CHANGE UP
BLOOD UR QL VISUAL: HIGH
BUN SERPL-MCNC: 49 MG/DL — HIGH (ref 7–23)
BUN SERPL-MCNC: 55 MG/DL — HIGH (ref 7–23)
CALCIUM SERPL-MCNC: 8 MG/DL — LOW (ref 8.4–10.5)
CALCIUM SERPL-MCNC: 8.8 MG/DL — SIGNIFICANT CHANGE UP (ref 8.4–10.5)
CHLORIDE SERPL-SCNC: 91 MMOL/L — LOW (ref 98–107)
CHLORIDE SERPL-SCNC: 93 MMOL/L — LOW (ref 98–107)
CK MB BLD-MCNC: 1 NG/ML — SIGNIFICANT CHANGE UP (ref 1–6.6)
CK MB BLD-MCNC: SIGNIFICANT CHANGE UP (ref 0–2.5)
CK SERPL-CCNC: 54 U/L — SIGNIFICANT CHANGE UP (ref 30–200)
CO2 SERPL-SCNC: 27 MMOL/L — SIGNIFICANT CHANGE UP (ref 22–31)
CO2 SERPL-SCNC: 31 MMOL/L — SIGNIFICANT CHANGE UP (ref 22–31)
COLOR SPEC: YELLOW — SIGNIFICANT CHANGE UP
CREAT SERPL-MCNC: 9.2 MG/DL — HIGH (ref 0.5–1.3)
CREAT SERPL-MCNC: 9.65 MG/DL — HIGH (ref 0.5–1.3)
EOSINOPHIL # BLD AUTO: 0.08 K/UL — SIGNIFICANT CHANGE UP (ref 0–0.5)
EOSINOPHIL # BLD AUTO: 0.1 K/UL — SIGNIFICANT CHANGE UP (ref 0–0.5)
EOSINOPHIL NFR BLD AUTO: 2.1 % — SIGNIFICANT CHANGE UP (ref 0–6)
EOSINOPHIL NFR BLD AUTO: 2.5 % — SIGNIFICANT CHANGE UP (ref 0–6)
GLUCOSE SERPL-MCNC: 114 MG/DL — HIGH (ref 70–99)
GLUCOSE SERPL-MCNC: 94 MG/DL — SIGNIFICANT CHANGE UP (ref 70–99)
GLUCOSE UR-MCNC: NEGATIVE — SIGNIFICANT CHANGE UP
HCT VFR BLD CALC: 23.8 % — LOW (ref 39–50)
HCT VFR BLD CALC: 26.9 % — LOW (ref 39–50)
HGB BLD-MCNC: 7.9 G/DL — LOW (ref 13–17)
HGB BLD-MCNC: 8.7 G/DL — LOW (ref 13–17)
IMM GRANULOCYTES # BLD AUTO: 0.01 # — SIGNIFICANT CHANGE UP
IMM GRANULOCYTES # BLD AUTO: 0.01 # — SIGNIFICANT CHANGE UP
IMM GRANULOCYTES NFR BLD AUTO: 0.2 % — SIGNIFICANT CHANGE UP (ref 0–1.5)
IMM GRANULOCYTES NFR BLD AUTO: 0.3 % — SIGNIFICANT CHANGE UP (ref 0–1.5)
INR BLD: 1.14 — SIGNIFICANT CHANGE UP (ref 0.88–1.17)
INR BLD: 1.14 — SIGNIFICANT CHANGE UP (ref 0.88–1.17)
KETONES UR-MCNC: NEGATIVE — SIGNIFICANT CHANGE UP
LACTATE SERPL-SCNC: 1 MMOL/L — SIGNIFICANT CHANGE UP (ref 0.5–2)
LEUKOCYTE ESTERASE UR-ACNC: HIGH
LYMPHOCYTES # BLD AUTO: 0.51 K/UL — LOW (ref 1–3.3)
LYMPHOCYTES # BLD AUTO: 0.59 K/UL — LOW (ref 1–3.3)
LYMPHOCYTES # BLD AUTO: 13.2 % — SIGNIFICANT CHANGE UP (ref 13–44)
LYMPHOCYTES # BLD AUTO: 14.5 % — SIGNIFICANT CHANGE UP (ref 13–44)
MAGNESIUM SERPL-MCNC: 1.6 MG/DL — SIGNIFICANT CHANGE UP (ref 1.6–2.6)
MCHC RBC-ENTMCNC: 27.4 PG — SIGNIFICANT CHANGE UP (ref 27–34)
MCHC RBC-ENTMCNC: 27.4 PG — SIGNIFICANT CHANGE UP (ref 27–34)
MCHC RBC-ENTMCNC: 32.3 % — SIGNIFICANT CHANGE UP (ref 32–36)
MCHC RBC-ENTMCNC: 33.2 % — SIGNIFICANT CHANGE UP (ref 32–36)
MCV RBC AUTO: 82.6 FL — SIGNIFICANT CHANGE UP (ref 80–100)
MCV RBC AUTO: 84.9 FL — SIGNIFICANT CHANGE UP (ref 80–100)
MONOCYTES # BLD AUTO: 0.51 K/UL — SIGNIFICANT CHANGE UP (ref 0–0.9)
MONOCYTES # BLD AUTO: 0.67 K/UL — SIGNIFICANT CHANGE UP (ref 0–0.9)
MONOCYTES NFR BLD AUTO: 13.2 % — SIGNIFICANT CHANGE UP (ref 2–14)
MONOCYTES NFR BLD AUTO: 16.4 % — HIGH (ref 2–14)
NEUTROPHILS # BLD AUTO: 2.7 K/UL — SIGNIFICANT CHANGE UP (ref 1.8–7.4)
NEUTROPHILS # BLD AUTO: 2.75 K/UL — SIGNIFICANT CHANGE UP (ref 1.8–7.4)
NEUTROPHILS NFR BLD AUTO: 66.2 % — SIGNIFICANT CHANGE UP (ref 43–77)
NEUTROPHILS NFR BLD AUTO: 70.9 % — SIGNIFICANT CHANGE UP (ref 43–77)
NITRITE UR-MCNC: NEGATIVE — SIGNIFICANT CHANGE UP
NON-SQ EPI CELLS # UR AUTO: 1 — SIGNIFICANT CHANGE UP
NRBC # FLD: 0 — SIGNIFICANT CHANGE UP
NRBC # FLD: 0 — SIGNIFICANT CHANGE UP
PH UR: 8 — SIGNIFICANT CHANGE UP (ref 4.6–8)
PHOSPHATE SERPL-MCNC: 4.6 MG/DL — HIGH (ref 2.5–4.5)
PLATELET # BLD AUTO: 141 K/UL — LOW (ref 150–400)
PLATELET # BLD AUTO: 145 K/UL — LOW (ref 150–400)
PMV BLD: 10.4 FL — SIGNIFICANT CHANGE UP (ref 7–13)
PMV BLD: 10.6 FL — SIGNIFICANT CHANGE UP (ref 7–13)
POTASSIUM SERPL-MCNC: 5 MMOL/L — SIGNIFICANT CHANGE UP (ref 3.5–5.3)
POTASSIUM SERPL-MCNC: 5.4 MMOL/L — HIGH (ref 3.5–5.3)
POTASSIUM SERPL-SCNC: 5 MMOL/L — SIGNIFICANT CHANGE UP (ref 3.5–5.3)
POTASSIUM SERPL-SCNC: 5.4 MMOL/L — HIGH (ref 3.5–5.3)
PROT SERPL-MCNC: 7 G/DL — SIGNIFICANT CHANGE UP (ref 6–8.3)
PROT UR-MCNC: 100 — SIGNIFICANT CHANGE UP
PROTHROM AB SERPL-ACNC: 12.8 SEC — SIGNIFICANT CHANGE UP (ref 9.8–13.1)
PROTHROM AB SERPL-ACNC: 12.8 SEC — SIGNIFICANT CHANGE UP (ref 9.8–13.1)
RBC # BLD: 2.88 M/UL — LOW (ref 4.2–5.8)
RBC # BLD: 3.17 M/UL — LOW (ref 4.2–5.8)
RBC # FLD: 16.6 % — HIGH (ref 10.3–14.5)
RBC # FLD: 16.9 % — HIGH (ref 10.3–14.5)
RBC CASTS # UR COMP ASSIST: SIGNIFICANT CHANGE UP (ref 0–?)
RH IG SCN BLD-IMP: NEGATIVE — SIGNIFICANT CHANGE UP
SODIUM SERPL-SCNC: 133 MMOL/L — LOW (ref 135–145)
SODIUM SERPL-SCNC: 137 MMOL/L — SIGNIFICANT CHANGE UP (ref 135–145)
SP GR SPEC: 1.01 — SIGNIFICANT CHANGE UP (ref 1–1.03)
SQUAMOUS # UR AUTO: SIGNIFICANT CHANGE UP
TROPONIN T SERPL-MCNC: 0.07 NG/ML — HIGH (ref 0–0.06)
UROBILINOGEN FLD QL: NORMAL E.U. — SIGNIFICANT CHANGE UP (ref 0.1–0.2)
WBC # BLD: 3.87 K/UL — SIGNIFICANT CHANGE UP (ref 3.8–10.5)
WBC # BLD: 4.08 K/UL — SIGNIFICANT CHANGE UP (ref 3.8–10.5)
WBC # FLD AUTO: 3.87 K/UL — SIGNIFICANT CHANGE UP (ref 3.8–10.5)
WBC # FLD AUTO: 4.08 K/UL — SIGNIFICANT CHANGE UP (ref 3.8–10.5)
WBC UR QL: SIGNIFICANT CHANGE UP (ref 0–?)

## 2017-11-26 PROCEDURE — 71275 CT ANGIOGRAPHY CHEST: CPT | Mod: 26

## 2017-11-26 PROCEDURE — 71020: CPT | Mod: 26

## 2017-11-26 PROCEDURE — 74174 CTA ABD&PLVS W/CONTRAST: CPT | Mod: 26

## 2017-11-26 PROCEDURE — 99222 1ST HOSP IP/OBS MODERATE 55: CPT | Mod: GC

## 2017-11-26 RX ORDER — LABETALOL HCL 100 MG
20 TABLET ORAL ONCE
Qty: 0 | Refills: 0 | Status: COMPLETED | OUTPATIENT
Start: 2017-11-26 | End: 2017-11-26

## 2017-11-26 RX ORDER — LABETALOL HCL 100 MG
200 TABLET ORAL ONCE
Qty: 0 | Refills: 0 | Status: COMPLETED | OUTPATIENT
Start: 2017-11-26 | End: 2017-11-26

## 2017-11-26 RX ORDER — ASPIRIN/CALCIUM CARB/MAGNESIUM 324 MG
81 TABLET ORAL DAILY
Qty: 0 | Refills: 0 | Status: DISCONTINUED | OUTPATIENT
Start: 2017-11-26 | End: 2017-11-27

## 2017-11-26 RX ORDER — NICARDIPINE HYDROCHLORIDE 30 MG/1
5 CAPSULE, EXTENDED RELEASE ORAL
Qty: 40 | Refills: 0 | Status: DISCONTINUED | OUTPATIENT
Start: 2017-11-26 | End: 2017-11-27

## 2017-11-26 RX ORDER — LABETALOL HCL 100 MG
20 TABLET ORAL ONCE
Qty: 0 | Refills: 0 | Status: DISCONTINUED | OUTPATIENT
Start: 2017-11-26 | End: 2017-11-26

## 2017-11-26 RX ORDER — LABETALOL HCL 100 MG
10 TABLET ORAL ONCE
Qty: 0 | Refills: 0 | Status: COMPLETED | OUTPATIENT
Start: 2017-11-26 | End: 2017-11-26

## 2017-11-26 RX ORDER — CALCITRIOL 0.5 UG/1
0.25 CAPSULE ORAL DAILY
Qty: 0 | Refills: 0 | Status: DISCONTINUED | OUTPATIENT
Start: 2017-11-26 | End: 2017-11-29

## 2017-11-26 RX ORDER — HYDRALAZINE HCL 50 MG
10 TABLET ORAL ONCE
Qty: 0 | Refills: 0 | Status: COMPLETED | OUTPATIENT
Start: 2017-11-26 | End: 2017-11-26

## 2017-11-26 RX ORDER — HEPARIN SODIUM 5000 [USP'U]/ML
5000 INJECTION INTRAVENOUS; SUBCUTANEOUS EVERY 8 HOURS
Qty: 0 | Refills: 0 | Status: DISCONTINUED | OUTPATIENT
Start: 2017-11-26 | End: 2017-11-27

## 2017-11-26 RX ORDER — LABETALOL HCL 100 MG
2 TABLET ORAL
Qty: 400 | Refills: 0 | Status: DISCONTINUED | OUTPATIENT
Start: 2017-11-26 | End: 2017-11-27

## 2017-11-26 RX ADMIN — HEPARIN SODIUM 5000 UNIT(S): 5000 INJECTION INTRAVENOUS; SUBCUTANEOUS at 22:36

## 2017-11-26 RX ADMIN — Medication 10 MILLIGRAM(S): at 20:12

## 2017-11-26 RX ADMIN — Medication 10 MILLIGRAM(S): at 19:04

## 2017-11-26 RX ADMIN — Medication 200 MILLIGRAM(S): at 16:40

## 2017-11-26 RX ADMIN — Medication 20 MILLIGRAM(S): at 19:40

## 2017-11-26 RX ADMIN — Medication 20 MILLIGRAM(S): at 19:07

## 2017-11-26 RX ADMIN — Medication 120 MG/MIN: at 19:45

## 2017-11-26 RX ADMIN — NICARDIPINE HYDROCHLORIDE 25 MG/HR: 30 CAPSULE, EXTENDED RELEASE ORAL at 21:50

## 2017-11-26 RX ADMIN — Medication 10 MILLIGRAM(S): at 18:28

## 2017-11-26 NOTE — H&P ADULT - PSH
Aortic dissection distal to left subclavian  s/p repair 2013  Cataract, bilateral  right 3/2015, right  5/2015  Chronic kidney disease (CKD)  pt s/p Right AVF, s/p Right First stage basilic vein transposition  Chronic kidney disease (CKD)  s/p Right AVF 2/16, 4/16  CRF (chronic renal failure)  AV fistula right upper arm inserted in 2016 May  History of cystoscopy  cryoablation  of prostate 2014  Mitral valve replaced  Bioprosthetic - Dr. Roy 2010  Pacemaker  2010 [employer] model #2210, with upgrade to AICD in 2015 [employer] model #[id number]  S/P Craniotomy  1991  S/P Nephrectomy  right partial nephrectomy 2006

## 2017-11-26 NOTE — H&P ADULT - ASSESSMENT
82M with extensive cardiac history, Type B dissection s/p repair in 2013, now with severe back pain, found to have enlarging infrarenal AAA suspicious for impending rupture.   - admit to vascular surgery Dr Palumbo  - KRISTOFERU consult for arterial line placement and BP control  - will contact CT surgery to discuss Type B dissection repair  - d/w'ed fellow Laly Gray R4  [facility] Sx [phone] 82M with extensive cardiac history, Type B dissection s/p repair in 2013, now with severe back pain, found to have enlarging infrarenal AAA suspicious for impending rupture.   - admit to vascular surgery Dr Palumbo  - SICU consult for arterial line placement and BP control < 110 SBP  - will contact CT surgery to discuss Type B dissection repair  - Nephrology consult for HD  - Cards evaluation for preop risk stratification and AICD eval  - plan for EVAR in AM tentatively  - d/w'ed fellow Laly Pedroza [facility] R4  [facility] Sx [phone]

## 2017-11-26 NOTE — PROCEDURE NOTE - NSPROCDETAILS_GEN_ALL_CORE
positive blood return obtained via catheter/location identified, draped/prepped, sterile technique used, needle inserted/introduced/Seldinger technique/all materials/supplies accounted for at end of procedure/connected to a pressurized flush line/ultrasound guidance/sutured in place/hemostasis with direct pressure, dressing applied

## 2017-11-26 NOTE — ED PROVIDER NOTE - MEDICAL DECISION MAKING DETAILS
83yo m pmhx htn cad AAA esrd aortic dissection p/w CC back pain - will send cbc cmp cardiac enzymes cxr CT angio for r/o dissection and reassess.

## 2017-11-26 NOTE — H&P ADULT - NSHPSOCIALHISTORY_GEN_ALL_CORE
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

## 2017-11-26 NOTE — ED PROVIDER NOTE - ATTENDING CONTRIBUTION TO CARE
Attending note:   After face to face evaluation of this patient, I concur with above noted hx, pe, and care plan for this patient. 81 y/o M with htn, crf, dialysis, aortic dissection in past and independent aneurysm which was fixed at [place], now with five days of worsening upper thoracic pain spreading from left to right.   Pulses present equally throughout.     Evaluation in progress

## 2017-11-26 NOTE — CHART NOTE - NSCHARTNOTEFT_GEN_A_CORE
Called Dr. Josiah Ennis's office [phone]] seeking medical records. Spoke to answering service who informed team they would be unable to fax records until tomorrow AM.    Called [facility] [Ho ([phone]]. at 19:30 seeking patient's medical records. Spoke to Admitting and they were to fax over patient's medical records. Attempted to call back at 20:05 without answer.

## 2017-11-26 NOTE — ED PROVIDER NOTE - PROGRESS NOTE DETAILS
initially called by radiology resident that ct stable, then called back with modification that there is outpouching of aorta with concern for impending rupture, vascular surgery called, evaluated surgery at bedside, state they are unsure if acute but will review with radiology and call us back guy moffett-rec s/o from dr. senior that patient is 82m who p/w chest pain, supposed to be dialyzed today but was not, initial prelim read per rads res stable and unchanged, received call back that there is concern for new outpouching of aorta with concern for impending rupture, vasc surgery at bedside, agree with radiology read, will admit patient to sicu, labetolol ordered for bp management. patient remains awake, alert, comfortable.

## 2017-11-26 NOTE — ED PROVIDER NOTE - CARDIAC, MLM
Normal rate, regular rhythm.  Heart sounds S1, S2.  No murmurs, rubs or gallops. Radial pulses equal B/L.

## 2017-11-26 NOTE — ED PROVIDER NOTE - OBJECTIVE STATEMENT
83yo m pmhx CAD, HTN, aortic dissection, AAA, ESRD on dialysis p/w CC severe back pain since Tuesday. Pt. states pain came on gradually, radiates between R and L scapula, describes it as "back ripping" pain, worse w/ movement. Reports some numbness/tingling in B/L upper extremities. Denies ha, visual changes, cp, sob, n/v/d, urinary symptoms. Pt. states he was supposed to be dialyzed today.

## 2017-11-26 NOTE — ED PROVIDER NOTE - PSH
Cataract, bilateral  right 3/2015, right  5/2015  Chronic kidney disease (CKD)  pt s/p Right AVF, s/p Right First stage basilic vein transposition  Chronic kidney disease (CKD)  s/p Right AVF 2/16, 4/16  CRF (chronic renal failure)  AV fistula right upper arm inserted in 2016 May  H/O aortic aneurysm repair  2013  History of cystoscopy  cryoablation  of prostate 2014  Mitral valve replaced  Bioprosthetic - Dr. Roy 2010  Pacemaker  2010 [employer] model #2210, with upgrade to AICD in 2015 [employer] model #[id number]  S/P Craniotomy  1991  S/P Nephrectomy  right partial nephrectomy 2006

## 2017-11-26 NOTE — H&P ADULT - HISTORY OF PRESENT ILLNESS
CC: Patient is a 82 y old male who presents with a chief complaint of severe back pain      Patient is a 82 year old male with PMHx of Type B aortic dissection s/p repair 2013 (at [place], Dr Ennis), ESRD on HD (last Th), AICD, MVR 2010, presents with severe back pain since Tuesday. The patient reports that he only followed up with Dr Ennis twice since the surgery and was told he could follow-up with Dr Oliveira (CT surgery) and Dr. Robles (cards). He reports last follow-up >6 months ago, and last imaging may date back to 2013. Denies having any pain like this ever before. Denies abdominal pain, nausea or vomiting. +Flatus, BM, non-bloody, no diarrhea. Pain is limited to upper back, worse with moving. Denies any LE pain, is ambulatory with assistance.       PMH  Aortic dissection  Pacemaker/AICD  Renal Carcinoma  BPH (benign prostatic hypertrophy)  Osteoarthritis  ESRD  Intracranial Subdural Hematoma  HTN  PHT (Pulmonary Hypertension)  CHF (Congestive Heart Failure)    PSH  AVF superficialization (RUE basilic vein transposition) 2017  AICD 2015  R nephrectomy 2006  Cataract, bilateral  Aortic dissection repair 2013  MVR 2010  Craniotomy for SDH 1991      MEDS  Labetalol 200 mg TID  ASA 81 mg  Calcitriol 0.25 mcg    Allergies  kiwi (Swelling)  No Known Drug Allergies

## 2017-11-26 NOTE — CONSULT NOTE ADULT - SUBJECTIVE AND OBJECTIVE BOX
REASON FOR ADMISSION: Patient is a 82y old  Male who presents with a chief complaint of back pain (2017 18:04)      HISTORY OF PRESENT ILLNESS: Patient is a 82 year old male with PMHx of Type B aortic dissection s/p repair  (at [place], Dr Ennis), ESRD on HD (last ), AICD, MVR , presents with severe back pain since Tuesday found to have enlarging infrarenal AAA suspicious for impending rupture. Patient is planned for emergent surgery and will need emergent dialysis. Patient presented with very high blood pressure; Hypertensive emergency; treated with labetalol drip and improved.     Patient seen and evaluated at this time. He looks comfortable and not in distress; Pain has much improved and hi has no other complaints    Dialysis consent signed and on the chart. Discussed with dialysis nurse who will come and dialyze tonight. Discussed with ICU team.      PMH  Aortic dissection  Pacemaker/AICD  Renal Carcinoma  BPH (benign prostatic hypertrophy)  Osteoarthritis  ESRD  Intracranial Subdural Hematoma  HTN  PHT (Pulmonary Hypertension)  CHF (Congestive Heart Failure)    PSH  AVF superficialization (RUE basilic vein transposition)   AICD   R nephrectomy 2006  Cataract, bilateral  Aortic dissection repair   MVR   Craniotomy for SDH       MEDS  Labetalol 200 mg TID  ASA 81 mg  Calcitriol 0.25 mcg    Allergies  kiwi (Swelling)  No Known Drug Allergies (2017 18:04)      REVIEW OF SYSTEMS: 12 reviw of systems negative except what is stated in HPI    PAST MEDICAL & SURGICAL HISTORY:  Mitral valve disorder  AAA (abdominal aortic aneurysm)  BPH (benign prostatic hypertrophy)  Osteoarthritis: left knee.  CKD (chronic kidney disease)  Aortic dissection  Pacemaker: 10/2010, BIV ICD upgrade in 2015  Renal Carcinoma  Intracranial Subdural Hematoma:  - no deficits  Essential Hypertension  PHT (Pulmonary Hypertension)  CHF (Congestive Heart Failure)  CRF (chronic renal failure): AV fistula right upper arm inserted in 2016 May  Chronic kidney disease (CKD): pt s/p Right AVF, s/p Right First stage basilic vein transposition  Chronic kidney disease (CKD): s/p Right AVF ,   History of cystoscopy: cryoablation  of prostate   Cataract, bilateral: right 3/2015, right  2015  H/O aortic aneurysm repair:   Pacemaker:  [employer] model #2210, with upgrade to AICD in  [employer] model #ME1989  Mitral valve replaced: Bioprosthetic - Dr. Roy   S/P Craniotomy:   S/P Nephrectomy: right partial nephrectomy       SOCIAL HISTORY: no smoking, drinking or drugs    FAMILY HISTORY: FAMILY HISTORY:  Family history of aortic dissection (Sibling)  Family history of stroke (Sibling)  Family history of heart failure (Sibling)  Hypertension (Sibling)  Diabetes mellitus (Sibling)  Family history of CVA      ALLERGIES: Allergies    kiwi (Swelling)  No Known Drug Allergies    Intolerances        HOME MEDICATIONA: reviewed and as in records  Home Medications:  labetalol 200 mg oral tablet: 1 tab(s) orally 3 times a day (23 May 2017 13:21)  multivitamin: 1 tab(s) orally once a day (23 May 2017 13:21)  Tumeric 1 tab orally daily:    (23 May 2017 13:21)  Vitamin B 100 mg orally daily:    (23 May 2017 13:21)      MEDICATIONS  (STANDING):  MEDICATIONS  (STANDING):  aspirin  chewable 81 milliGRAM(s) Oral daily  calcitriol   Capsule 0.25 MICROGram(s) Oral daily  heparin  Injectable 5000 Unit(s) SubCutaneous every 8 hours  labetalol Infusion 2 mG/Min (120 mL/Hr) IV Continuous <Continuous>  niCARdipine Infusion 5 mG/Hr (25 mL/Hr) IV Continuous <Continuous>    MEDICATIONS  (PRN):      PHYSICAL EXAMINATION  VITAL SIGNS:  Vital Signs Last 24 Hrs  T(C): 36.4 (2017 19:30), Max: 37.1 (2017 11:48)  T(F): 97.5 (2017 19:30), Max: 98.8 (2017 11:48)  HR: 60 (2017 21:05) (59 - 70)  BP: 195/69 (2017 20:00) (155/78 - 210/63)  BP(mean): 103 (2017 20:00) (95 - 103)  RR: 20 (2017 21:05) (12 - 20)  SpO2: 100% (2017 21:05) (99% - 100%)  I&O's Summary    2017 07:01  -  2017 21:57  --------------------------------------------------------  IN: 600 mL / OUT: 0 mL / NET: 600 mL        GA: awake and alert, no distress  EYES: EOMI, clear conj, anicteric sclera  ENT: MMM, clear OP, neck supple  LUNGS: CTABL, no wrc, normal effort  HEART; RRR, no mrg, no peripheral edema  ABD; Soft, NT/ND/BS+, no peritoneal signs  EXT; well perfused, no edema or cyanosis  ; no ribeiro  NEURO: AAO x 3, sensation and motor intact  SKIN: warm and dry, no rash on visible skin    LABORATORY DATA:                        8.7    4.08  )-----------( 145      ( 2017 11:18 )             26.9         137  |  93<L>  |  49<H>  ----------------------------<  94  5.4<H>   |  31  |  9.20<H>    Ca    8.8      2017 11:18    TPro  7.0  /  Alb  4.0  /  TBili  0.8  /  DBili  x   /  AST  9   /  ALT  4   /  AlkPhos  63      LIVER FUNCTIONS - ( 2017 11:18 )  Alb: 4.0 g/dL / Pro: 7.0 g/dL / ALK PHOS: 63 u/L / ALT: 4 u/L / AST: 9 u/L / GGT: x             Urinalysis Basic - ( 2017 14:11 )    Color: YELLOW / Appearance: CLEAR / S.013 / pH: 8.0  Gluc: NEGATIVE / Ketone: NEGATIVE  / Bili: NEGATIVE / Urobili: NORMAL E.U.   Blood: MODERATE / Protein: 100 / Nitrite: NEGATIVE   Leuk Esterase: MODERATE / RBC: 25-50 / WBC 25-50   Sq Epi: OCC / Non Sq Epi: x / Bacteria: x        IMAGING: Reviewed and as per records: pertinent positives:     CT Angio Abdomen and Pelvis w/ IV Cont (17 @ 14:04) >  	VESSELS: No central pulmonary arterial embolism. Right subclavian artery   	stent which is patent. Patient is status post repair of a type B aortic   	dissection utilizing graft material. The graft repair is up to the region   	of the distal thoracic aorta.    	There is residual dissection flap within the abdominal aorta extending to   	both common iliac arteries. The true lumen is well opacified supplying   	the celiac, superior mesenteric and inferior mesenteric arteries  as well   	as both renal arteries which are well opacified without stenosis.. The   	true lumen opacified the right common iliac, external iliac and common   	femoral artery. It also opacified the left common iliac and left internal   	iliac artery. There is limited opacification of the false lumen within   	the abdominal aorta likely related to slow flow. The false lumen results   	in narrowing of the true lumen within the abdominal aorta and right   	common iliac and left common iliac arteries. There is nonopacification of   	the left external iliac and common femoral artery which are likely   	perfused by the false lumen and are not opacified related to slow flow.  	This could be further evaluated with repeat CTA with delayed images.    	There is residual aneurysmal dilatation of the abdominal aorta at the   	level of the hiatus which measures 4.1 x 3.3 cm, not significant changed.   	There is aneurysmal dilatation of the distal abdominal aorta measuring   	5.3 x 3.4 cm, previously measuring 2.4 x 2.5 cm. Focal outpouching is   	noted in the wall of the false lumen at that level. This place patient at   	risk for impending rupture.    	There are calcifications ofthe right common femoral and external iliac   	veins suggestive of sequela of prior thrombus.    	HEART: Cardiomegaly. No pericardial effusion. Left chest wall AICD with   	leads terminating in the right atrium, right ventricle and coronary sinus.  	MEDIASTINUM AND JEM: Small, subcentimeter short axis, mediastinal lymph   	nodes.  	CHEST WALL AND LOWER NECK: Within normal limits.      	IMPRESSION:     	Enlarging infrarenal abdominal aortic aneurysm as described above   	suspicious for impending rupture.    	Patient isstatus post repair of a type B aortic dissection with   	persistent abdominal aortic dissection. All major abdominal vessels   	appear to originate from the true lumen and are patent.    	There is nonopacification of the left external iliac and common femoral   	artery which are likely perfused by the false lumen and are not opacified   	related to slow flow. This could be further evaluated with repeat CTA   	with delayed images.    	Interval enlargement of a hyperdense lesion in the lower pole the right   	kidney. Consider for the evaluation with renal ultrasound if clinically   	indicated.          ASSESSMENT: Patient is a 82 year old male with PMHx of Type B aortic dissection s/p repair  (at [place], Dr Ennis), ESRD on HD (last ), AICD, MVR , presents with severe back pain since Tuesday found to have enlarging infrarenal AAA suspicious for impending rupture. Patient is planned for emergent surgery and will need emergent dialysis. Patient presented with very high blood pressure; Hypertensive emergency; treated with labetalol drip and improved.       1. ESRD on HD TTS, last HD on Saturday through a right AVF on the upper arm.   2. Hypertensive emergency associated with impending AAA rupture and aortic dissection  3. Hyperkalemia of 5.4  4. Anemia likely of chronic disease    RECOMMENDATIONS:  - emergently dialyze tonight considering hyperkalemia and pending surgery which can be prolonged  - Dialyze for 3 hrs on 2 K bath with minimal fluid removal  - Send type and screen; patient will likely need transfusion during surgery, H&H low.   - will continue MYA therapy with dialysis afterwards  - continue to monitor BP; if still an issue would use nicardipine as it is easily titrated  - keep on low K diet  - dose meds per GFR less than 15 ml/min    Discussed with patient, ICU team and dialysis nurse    patient required critical care including evaluation of critical conditions and labs, orders, discussing plan with the team and documentation  Critical care time of 45 min.    Thank you for allowing me to participate on this patient's care. Please do not hesitate to call with questions.    I will follow with you.    Santhosh Mishra MD   [facility]  [phone]

## 2017-11-26 NOTE — H&P ADULT - NSHPLABSRESULTS_GEN_ALL_CORE
Labs:                        8.7    4.08  )-----------( 145      ( 2017 11:18 )             26.9         137  |  93<L>  |  49<H>  ----------------------------<  94  5.4<H>   |  31  |  9.20<H>    Ca    8.8      2017 11:18    TPro  7.0  /  Alb  4.0  /  TBili  0.8  /  DBili  x   /  AST  9   /  ALT  4   /  AlkPhos  63      PT/INR - ( 2017 11:18 )   PT: 12.8 SEC;   INR: 1.14          PTT - ( 2017 11:18 )  PTT:28.6 SEC  Urinalysis Basic - ( 2017 14:11 )    Color: YELLOW / Appearance: CLEAR / S.013 / pH: 8.0  Gluc: NEGATIVE / Ketone: NEGATIVE  / Bili: NEGATIVE / Urobili: NORMAL E.U.   Blood: MODERATE / Protein: 100 / Nitrite: NEGATIVE   Leuk Esterase: MODERATE / RBC: 25-50 / WBC 25-50   Sq Epi: OCC / Non Sq Epi: x / Bacteria: x            Imaging:  < from: CT Angio Abdomen and Pelvis w/ IV Cont (17 @ 14:04) >  VESSELS: No central pulmonary arterial embolism. Right subclavian artery   stent which is patent. Patient is status post repair of a type B aortic   dissection utilizing graft material. The graft repair is up to the region   of the distal thoracic aorta.    There is residual dissection flap within the abdominal aorta extending to   both common iliac arteries. The true lumen is well opacified supplying   the celiac, superior mesenteric and inferior mesenteric arteries  as well   as both renal arteries which are well opacified without stenosis.. The   true lumen opacified the right common iliac, external iliac and common   femoral artery. It also opacified the left common iliac and left internal   iliac artery. There is limited opacification of the false lumen within   the abdominal aorta likely related to slow flow. The false lumen results   in narrowing of the true lumen within the abdominal aorta and right   common iliac and left common iliac arteries. There is nonopacification of   the left external iliac and common femoral artery which are likely   perfused by the false lumen and are not opacified related to slow flow.  This could be further evaluated with repeat CTA with delayed images.    There is residual aneurysmal dilatation of the abdominal aorta at the   level of the hiatus which measures 4.1 x 3.3 cm, not significant changed.   There is aneurysmal dilatation of the distal abdominal aorta measuring   5.3 x 3.4 cm, previously measuring 2.4 x 2.5 cm. Focal outpouching is   noted in the wall of the false lumen at that level. This place patient at   risk for impending rupture.    There are calcifications ofthe right common femoral and external iliac   veins suggestive of sequela of prior thrombus.    HEART: Cardiomegaly. No pericardial effusion. Left chest wall AICD with   leads terminating in the right atrium, right ventricle and coronary sinus.  MEDIASTINUM AND JEM: Small, subcentimeter short axis, mediastinal lymph   nodes.  CHEST WALL AND LOWER NECK: Within normal limits.      IMPRESSION:     Enlarging infrarenal abdominal aortic aneurysm as described above   suspicious for impending rupture.    Patient isstatus post repair of a type B aortic dissection with   persistent abdominal aortic dissection. All major abdominal vessels   appear to originate from the true lumen and are patent.    There is nonopacification of the left external iliac and common femoral   artery which are likely perfused by the false lumen and are not opacified   related to slow flow. This could be further evaluated with repeat CTA   with delayed images.    Interval enlargement of a hyperdense lesion in the lower pole the right   kidney. Consider for the evaluation with renal ultrasound if clinically   indicated.    < end of copied text >

## 2017-11-26 NOTE — CONSULT NOTE ADULT - ATTENDING COMMENTS
I have personally interviewed and examined this patient, reviewed pertinent labs and imaging, and discussed the case with colleagues, residents, physician assistants, and nurses on SICU rounds.      31   minutes in total were spent in providing direct critical care for the diagnoses, assessment and plan outlined below.  These diagnoses are unrelated to the surgical procedure.  Additionally, time spent in the performance of separately billable procedures was not counted toward the critical care time.  There is no overlap.    The active critical care issues are:  1. malignant hypertension, maximize beta-blocker for BP (has pacer for rescue), add calcium-channel blocker as needed  2. ESRD, optimize with pre-op HD  3. coordinate operative intervention with surgical service

## 2017-11-26 NOTE — ED PROVIDER NOTE - FAMILY HISTORY
Family history of CVA     Sibling  Still living? Unknown  Diabetes mellitus, Age at diagnosis: Age Unknown  Hypertension, Age at diagnosis: Age Unknown  Family history of heart failure, Age at diagnosis: Age Unknown  Family history of stroke, Age at diagnosis: Age Unknown  Family history of aortic dissection, Age at diagnosis: Age Unknown

## 2017-11-26 NOTE — CONSULT NOTE ADULT - SUBJECTIVE AND OBJECTIVE BOX
SICU Consultation Note  =====================================================  HPI: Patient is an 82y male with a PMH of       HISTORY  82y Male  Allergies: kiwi (Swelling)    PAST MEDICAL & SURGICAL HISTORY:  Mitral valve disorder  AAA (abdominal aortic aneurysm)  BPH (benign prostatic hypertrophy)  Osteoarthritis: left knee.  CKD (chronic kidney disease)  Aortic dissection  Pacemaker: 10/2010, BIV ICD upgrade in 9/2015  Renal Carcinoma  Intracranial Subdural Hematoma: 1991 - no deficits  Essential Hypertension  PHT (Pulmonary Hypertension)  CHF (Congestive Heart Failure)  CRF (chronic renal failure): AV fistula right upper arm inserted in 2016 May  Chronic kidney disease (CKD): pt s/p Right AVF, s/p Right First stage basilic vein transposition  Chronic kidney disease (CKD): s/p Right AVF 2/16, 4/16  History of cystoscopy: cryoablation  of prostate 2014  Cataract, bilateral: right 3/2015, right  5/2015  H/O aortic aneurysm repair: 2013  Pacemaker: 2010 [employer] model #2210, with upgrade to AICD in 2015 [employer] model #CJ6281  Mitral valve replaced: Bioprosthetic - Dr. Roy 2010  S/P Craniotomy: 1991  S/P Nephrectomy: right partial nephrectomy 2006    FAMILY HISTORY:  Family history of aortic dissection (Sibling)  Family history of stroke (Sibling)  Family history of heart failure (Sibling)  Hypertension (Sibling)  Diabetes mellitus (Sibling)  Family history of CVA      SOCIAL HISTORY:  ***    ADVANCE DIRECTIVES: Presumed Full Code  ***    REVIEW OF SYSTEMS:  ***  General: Non-Contributory  Skin/Breast: Non-Contributory  Ophthalmologic: Non-Contributory  ENMT: Non-Contributory  Respiratory and Thorax: Non-Contributory  Cardiovascular: Non-Contributory  Gastrointestinal: Non-Contributory  Genitourinary: Non-Contributory  Musculoskeletal: Non-Contributory  Neurological: Non-Contributory  Psychiatric: Non-Contributory  Hematology/Lymphatics: Non-Contributory  Endocrine: Non-Contributory  Allergic/Immunologic: Non-Contributory    HOME MEDICATIONS:  ***    CURRENT MEDICATIONS:   --------------------------------------------------------------------------------------  Neurologic Medications    Respiratory Medications    Cardiovascular Medications  labetalol Infusion 2 mG/Min IV Continuous <Continuous>  niCARdipine Infusion 5 mG/Hr IV Continuous <Continuous>    Gastrointestinal Medications  calcitriol   Capsule 0.25 MICROGram(s) Oral daily    Genitourinary Medications    Hematologic/Oncologic Medications  aspirin  chewable 81 milliGRAM(s) Oral daily  heparin  Injectable 5000 Unit(s) SubCutaneous every 8 hours    Antimicrobial/Immunologic Medications    Endocrine/Metabolic Medications    Topical/Other Medications    --------------------------------------------------------------------------------------    VITAL SIGNS, INS/OUTS (last 24 hours):  --------------------------------------------------------------------------------------  ((Insert SICU Vitals / Is+Os here)) ***  --------------------------------------------------------------------------------------    EXAM  NEUROLOGY  RASS:   	GCS:    Exam: Normal, NAD, alert, oriented x 3, no focal deficits. ***    HEENT  Exam: Normocephalic, atraumatic.  EOMI ***    RESPIRATORY  Exam: Lungs clear to auscultation, Normal expansion/effort.  ***  Mechanical Ventilation:     CARDIOVASCULAR  Exam: S1, S2.  Regular rate and rhythm.  Peripheral edema  ***    GI/NUTRITION  Exam: Abdomen soft, Non-tender, Non-distended.  Gastrostomy / Jejunostomy / Nasogastric tube in place.  Colostomy / Ileostomy.  ***  Wound:   ***  Current Diet:  NPO ***    VASCULAR  Exam: Extremities warm, pink, well-perfused.  ***    MUSCULOSKELETAL  Exam: All extremities moving spontaneously without limitations.  ***    SKIN:  Exam: Good skin turgor, no skin breakdown.  ***    METABOLIC/FLUIDS/ELECTROLYTES  calcitriol   Capsule 0.25 MICROGram(s) Oral daily      HEMATOLOGIC  [x] DVT Prophylaxis: aspirin  chewable 81 milliGRAM(s) Oral daily  heparin  Injectable 5000 Unit(s) SubCutaneous every 8 hours    Transfusions:	[] PRBC	[] Platelets		[] FFP	[] Cryoprecipitate    INFECTIOUS DISEASE  Antimicrobials/Immunologic Medications:    Day #  	of    ***    Tubes/Lines/Drains  ***  [x] Peripheral IV  [] Central Venous Line     	[] R	[] L	[] IJ	[] Fem	[] SC	Date Placed:   [] Arterial Line		[] R	[] L	[] Fem	[] Rad	[] Ax	Date Placed:   [] PICC:         	[] Midline		[] Mediport  [] Urinary Catheter		Date Placed:     LABS  --------------------------------------------------------------------------------------  ((Insert SICU Labs here))***  --------------------------------------------------------------------------------------    OTHER LABS    IMAGING RESULTS  Echo:   CT:   Xray:     ASSESSMENT:  82y Male ***    PLAN:  ***  Neurologic:   Respiratory:   Cardiovascular:   Gastrointestinal/Nutrition:   Renal/Genitourinary:   Hematologic:   Infectious Disease:   Tubes/Lines/Drains:   Endocrine:   Disposition:     --------------------------------------------------------------------------------------    Critical Care Diagnoses: SICU Consultation Note  =====================================================  HPI: Patient is an 82y male with a PMH of ESRD, Aortic dissection type B s/p repair 2013 (Dr. Ennis), Pacemaker 2015, CAD, CHF, partial neprhrectomy for RCC (2006), MVR in 2010 (Dr. Oliveira), RUE AVF who presented to the hospital with severe upper back pain that started 5 days ago.  The pain is worse with movement.  He denies having any nausea, vomiting or abdominal pain.  In the ED he was hypertensive with SBP greater than 200s.  He was given IV labetalol several times along with hydralazine.      HISTORY  82y Male  Allergies: kiwi (Swelling)    PAST MEDICAL & SURGICAL HISTORY:  Mitral valve disorder  AAA (abdominal aortic aneurysm)  BPH (benign prostatic hypertrophy)  Osteoarthritis: left knee.  CKD (chronic kidney disease)  Aortic dissection  Pacemaker: 10/2010, BIV ICD upgrade in 9/2015  Renal Carcinoma  Intracranial Subdural Hematoma: 1991 - no deficits  Essential Hypertension  PHT (Pulmonary Hypertension)  CHF (Congestive Heart Failure)  CRF (chronic renal failure): AV fistula right upper arm inserted in 2016 May  Chronic kidney disease (CKD): pt s/p Right AVF, s/p Right First stage basilic vein transposition  Chronic kidney disease (CKD): s/p Right AVF 2/16, 4/16  History of cystoscopy: cryoablation  of prostate 2014  Cataract, bilateral: right 3/2015, right  5/2015  H/O aortic aneurysm repair: 2013  Pacemaker: 2010 [employer] model #2210, with upgrade to AICD in 2015 [employer] model #[id number]  Mitral valve replaced: Bioprosthetic - Dr. Roy 2010  S/P Craniotomy: 1991  S/P Nephrectomy: right partial nephrectomy 2006    FAMILY HISTORY:  Family history of aortic dissection (Sibling)  Family history of stroke (Sibling)  Family history of heart failure (Sibling)  Hypertension (Sibling)  Diabetes mellitus (Sibling)  Family history of CVA    REVIEW OF SYSTEMS:  General: Non-Contributory  Skin/Breast: Non-Contributory  Ophthalmologic: No visual changes  ENMT: Non-Contributory  Respiratory and Thorax: Non-Contributory  Cardiovascular: hx of malignant HTN  Gastrointestinal: No abdominal pain, no nausea or vomiting.  Genitourinary: no dysuria  Musculoskeletal: Non-Contributory  Neurological: Non-Contributory  Psychiatric: Non-Contributory  Hematology/Lymphatics: Non-Contributory  Endocrine: Non-Contributory  Allergic/Immunologic: Non-Contributory    HOME MEDICATIONS:    -ASA 81  -Labetalol 200 TID  -Calcitriol 0.25mg    CURRENT MEDICATIONS:   --------------------------------------------------------------------------------------  Neurologic Medications    Respiratory Medications    Cardiovascular Medications  labetalol Infusion 2 mG/Min IV Continuous <Continuous>  niCARdipine Infusion 5 mG/Hr IV Continuous <Continuous>    Gastrointestinal Medications  calcitriol   Capsule 0.25 MICROGram(s) Oral daily    Genitourinary Medications    Hematologic/Oncologic Medications  aspirin  chewable 81 milliGRAM(s) Oral daily  heparin  Injectable 5000 Unit(s) SubCutaneous every 8 hours    Antimicrobial/Immunologic Medications    Endocrine/Metabolic Medications    Topical/Other Medications    --------------------------------------------------------------------------------------    VITAL SIGNS, INS/OUTS (last 24 hours):  --------------------------------------------------------------------------------------  Vital Signs Last 24 Hrs  T(C): 36.4 (27 Nov 2017 00:00), Max: 37.1 (26 Nov 2017 11:48)  T(F): 97.6 (27 Nov 2017 00:00), Max: 98.8 (26 Nov 2017 11:48)  HR: 60 (27 Nov 2017 01:22) (59 - 70)  BP: 195/69 (26 Nov 2017 20:00) (155/78 - 210/63)  BP(mean): 103 (26 Nov 2017 20:00) (95 - 103)  RR: 14 (27 Nov 2017 01:22) (12 - 21)  SpO2: 98% (27 Nov 2017 01:22) (97% - 100%)  --------------------------------------------------------------------------------------    EXAM  NEUROLOGY  RASS:   	GCS:    Exam: Normal, NAD, alert, oriented x 3, communicative.    HEENT  Exam: Normocephalic, atraumatic.    RESPIRATORY  No tachypnea or accessory muscle use.    CARDIOVASCULAR  Exam: S1, S2.  regular rate and rhythm (patient is paced)    GI/NUTRITION  Exam: Abdomen soft, Non-tender, Non-distended.    Current Diet:  NPO ***    MUSCULOSKELETAL  Exam: All extremities moving spontaneously without limitations.    SKIN:  Exam: Good skin turgor, no skin breakdown.    METABOLIC/FLUIDS/ELECTROLYTES  calcitriol   Capsule 0.25 MICROGram(s) Oral daily      HEMATOLOGIC  [x] DVT Prophylaxis: aspirin  chewable 81 milliGRAM(s) Oral daily  heparin  Injectable 5000 Unit(s) SubCutaneous every 8 hours      Tubes/Lines/Drains  ***  [x] Peripheral IV  [] Central Venous Line     	[] R	[] L	[] IJ	[] Fem	[] SC	Date Placed:   [] Arterial Line		[] R	[x] L	[] Fem	[x] Rad	[] Ax	Date Placed:   [] PICC:         	[] Midline		[] Mediport  [] Urinary Catheter		Date Placed:     LABS  --------------------------------------------------------------------------------------  ((Insert SICU Labs here))***  --------------------------------------------------------------------------------------    OTHER LABS    IMAGING RESULTS  CT: Enlarging infrarenal abdominal aortic aneurysm as described above   suspicious for impending rupture.    Patient is status post repair of a type B aortic dissection with   persistent abdominal aortic dissection. All major abdominal vessels   appear to originate from the true lumen and are patent.    There is nonopacification of the left external iliac and common femoral   artery which are likely perfused by the false lumen and are not opacified   related to slow flow. This could be further evaluated with repeat CTA   with delayed images.    Interval enlargement of a hyperdense lesion in the lower pole the right   kidney. Consider for the evaluation with renal ultrasound if clinically   indicated.    ASSESSMENT:  Patient is an 82 year old male with Past Surgical History of type B aortic dissection and surgical correction who has an impending AAA rupture.    PLAN:  Neurologic:  Neurological checks to assess for acute changes.  Tylenol and for pain control  Respiratory: Maintain O2 saturation greater than 92% with NC as needed.  Cardiovascular: Maintain SBP less than 110.  NIcardipine gtt titrated to meet this goal.  Possible OR tomorrow for operative correction of AAA.  Gastrointestinal/Nutrition: Will keep patient NPO tonight in case need for emergent operation.  Renal/Genitourinary: Will get dialysis today to remove fluid to help decrease blood pressure.  Follow up renal recommendations.  Continue home dose of calcitriol   Hematologic: SQH for VTE px.  Continue home dose of aspirin 81.  Infectious Disease: No need for abx at this time.  Follow up WBC.  Tubes/Lines/Drains: left radial a-line, peripheral IV  Endocrine: F/U AM BMP glucose.  Disposition:  SICU, possible OR tomorrow.    --------------------------------------------------------------------------------------    Critical Care Diagnoses:

## 2017-11-26 NOTE — H&P ADULT - PMH
Aortic dissection    BPH (benign prostatic hypertrophy)    CHF (Congestive Heart Failure)    CKD (chronic kidney disease)    Essential Hypertension    Intracranial Subdural Hematoma  1991 - no deficits  Mitral valve disorder    Osteoarthritis  left knee.  Pacemaker  10/2010, BIV ICD upgrade in 9/2015  PHT (Pulmonary Hypertension)    Renal Carcinoma

## 2017-11-26 NOTE — ED ADULT TRIAGE NOTE - CHIEF COMPLAINT QUOTE
Pt brought in by EMS from home complaining of upper back pain x3 days and intermittent chest pain. Pt denies SOB, N/V/D, abdominal pain.

## 2017-11-27 DIAGNOSIS — I71.01 DISSECTION OF THORACIC AORTA: Chronic | ICD-10-CM

## 2017-11-27 LAB
ANTIBODY ID 1_1: SIGNIFICANT CHANGE UP
ANTIBODY ID 1_2: SIGNIFICANT CHANGE UP
APTT BLD: 37.6 SEC — HIGH (ref 27.5–37.4)
BASOPHILS # BLD AUTO: 0.01 K/UL — SIGNIFICANT CHANGE UP (ref 0–0.2)
BASOPHILS NFR BLD AUTO: 0.3 % — SIGNIFICANT CHANGE UP (ref 0–2)
BUN SERPL-MCNC: 18 MG/DL — SIGNIFICANT CHANGE UP (ref 7–23)
CALCIUM SERPL-MCNC: 8.3 MG/DL — LOW (ref 8.4–10.5)
CHLORIDE SERPL-SCNC: 94 MMOL/L — LOW (ref 98–107)
CO2 SERPL-SCNC: 30 MMOL/L — SIGNIFICANT CHANGE UP (ref 22–31)
CREAT SERPL-MCNC: 3.82 MG/DL — HIGH (ref 0.5–1.3)
DAT C3-SP REAG RBC QL: NEGATIVE — SIGNIFICANT CHANGE UP
DAT POLY-SP REAG RBC QL: POSITIVE — SIGNIFICANT CHANGE UP
DIRECT COOMBS IGG: POSITIVE — SIGNIFICANT CHANGE UP
EOSINOPHIL # BLD AUTO: 0.05 K/UL — SIGNIFICANT CHANGE UP (ref 0–0.5)
EOSINOPHIL NFR BLD AUTO: 1.3 % — SIGNIFICANT CHANGE UP (ref 0–6)
GLUCOSE SERPL-MCNC: 89 MG/DL — SIGNIFICANT CHANGE UP (ref 70–99)
HBV CORE AB SER-ACNC: REACTIVE — SIGNIFICANT CHANGE UP
HBV SURFACE AB SER-ACNC: 321.3 MLU/ML — SIGNIFICANT CHANGE UP
HBV SURFACE AG SER-ACNC: NEGATIVE — SIGNIFICANT CHANGE UP
HCT VFR BLD CALC: 25.3 % — LOW (ref 39–50)
HGB BLD-MCNC: 8.2 G/DL — LOW (ref 13–17)
IMM GRANULOCYTES # BLD AUTO: 0.01 # — SIGNIFICANT CHANGE UP
IMM GRANULOCYTES NFR BLD AUTO: 0.3 % — SIGNIFICANT CHANGE UP (ref 0–1.5)
INR BLD: 1.13 — SIGNIFICANT CHANGE UP (ref 0.88–1.17)
LYMPHOCYTES # BLD AUTO: 0.44 K/UL — LOW (ref 1–3.3)
LYMPHOCYTES # BLD AUTO: 11.6 % — LOW (ref 13–44)
MAGNESIUM SERPL-MCNC: 1.7 MG/DL — SIGNIFICANT CHANGE UP (ref 1.6–2.6)
MCHC RBC-ENTMCNC: 26.5 PG — LOW (ref 27–34)
MCHC RBC-ENTMCNC: 32.4 % — SIGNIFICANT CHANGE UP (ref 32–36)
MCV RBC AUTO: 81.9 FL — SIGNIFICANT CHANGE UP (ref 80–100)
MONOCYTES # BLD AUTO: 0.51 K/UL — SIGNIFICANT CHANGE UP (ref 0–0.9)
MONOCYTES NFR BLD AUTO: 13.4 % — SIGNIFICANT CHANGE UP (ref 2–14)
NEUTROPHILS # BLD AUTO: 2.78 K/UL — SIGNIFICANT CHANGE UP (ref 1.8–7.4)
NEUTROPHILS NFR BLD AUTO: 73.1 % — SIGNIFICANT CHANGE UP (ref 43–77)
NRBC # FLD: 0 — SIGNIFICANT CHANGE UP
PHOSPHATE SERPL-MCNC: 1.9 MG/DL — LOW (ref 2.5–4.5)
PLATELET # BLD AUTO: 154 K/UL — SIGNIFICANT CHANGE UP (ref 150–400)
PMV BLD: 10.3 FL — SIGNIFICANT CHANGE UP (ref 7–13)
POTASSIUM SERPL-MCNC: 3.4 MMOL/L — LOW (ref 3.5–5.3)
POTASSIUM SERPL-SCNC: 3.4 MMOL/L — LOW (ref 3.5–5.3)
PROTHROM AB SERPL-ACNC: 12.7 SEC — SIGNIFICANT CHANGE UP (ref 9.8–13.1)
RBC # BLD: 3.09 M/UL — LOW (ref 4.2–5.8)
RBC # FLD: 16.8 % — HIGH (ref 10.3–14.5)
SODIUM SERPL-SCNC: 137 MMOL/L — SIGNIFICANT CHANGE UP (ref 135–145)
WBC # BLD: 3.8 K/UL — SIGNIFICANT CHANGE UP (ref 3.8–10.5)
WBC # FLD AUTO: 3.8 K/UL — SIGNIFICANT CHANGE UP (ref 3.8–10.5)

## 2017-11-27 PROCEDURE — 71275 CT ANGIOGRAPHY CHEST: CPT | Mod: 26

## 2017-11-27 PROCEDURE — 99291 CRITICAL CARE FIRST HOUR: CPT

## 2017-11-27 PROCEDURE — 74174 CTA ABD&PLVS W/CONTRAST: CPT | Mod: 26

## 2017-11-27 PROCEDURE — 74000: CPT | Mod: 26

## 2017-11-27 PROCEDURE — 99232 SBSQ HOSP IP/OBS MODERATE 35: CPT

## 2017-11-27 RX ORDER — LABETALOL HCL 100 MG
200 TABLET ORAL
Qty: 0 | Refills: 0 | Status: DISCONTINUED | OUTPATIENT
Start: 2017-11-27 | End: 2017-11-28

## 2017-11-27 RX ORDER — ASPIRIN/CALCIUM CARB/MAGNESIUM 324 MG
81 TABLET ORAL
Qty: 0 | Refills: 0 | Status: DISCONTINUED | OUTPATIENT
Start: 2017-11-28 | End: 2017-11-29

## 2017-11-27 RX ORDER — HYDROMORPHONE HYDROCHLORIDE 2 MG/ML
1 INJECTION INTRAMUSCULAR; INTRAVENOUS; SUBCUTANEOUS ONCE
Qty: 0 | Refills: 0 | Status: DISCONTINUED | OUTPATIENT
Start: 2017-11-27 | End: 2017-11-27

## 2017-11-27 RX ORDER — LABETALOL HCL 100 MG
200 TABLET ORAL
Qty: 0 | Refills: 0 | Status: DISCONTINUED | OUTPATIENT
Start: 2017-11-27 | End: 2017-11-27

## 2017-11-27 RX ORDER — HEPARIN SODIUM 5000 [USP'U]/ML
5000 INJECTION INTRAVENOUS; SUBCUTANEOUS EVERY 8 HOURS
Qty: 0 | Refills: 0 | Status: DISCONTINUED | OUTPATIENT
Start: 2017-11-27 | End: 2017-11-29

## 2017-11-27 RX ORDER — NICARDIPINE HYDROCHLORIDE 30 MG/1
5 CAPSULE, EXTENDED RELEASE ORAL
Qty: 40 | Refills: 0 | Status: DISCONTINUED | OUTPATIENT
Start: 2017-11-27 | End: 2017-11-28

## 2017-11-27 RX ORDER — LABETALOL HCL 100 MG
10 TABLET ORAL ONCE
Qty: 0 | Refills: 0 | Status: DISCONTINUED | OUTPATIENT
Start: 2017-11-27 | End: 2017-11-27

## 2017-11-27 RX ADMIN — Medication 81 MILLIGRAM(S): at 11:49

## 2017-11-27 RX ADMIN — Medication 200 MILLIGRAM(S): at 19:29

## 2017-11-27 RX ADMIN — NICARDIPINE HYDROCHLORIDE 25 MG/HR: 30 CAPSULE, EXTENDED RELEASE ORAL at 10:06

## 2017-11-27 RX ADMIN — CALCITRIOL 0.25 MICROGRAM(S): 0.5 CAPSULE ORAL at 11:49

## 2017-11-27 RX ADMIN — HEPARIN SODIUM 5000 UNIT(S): 5000 INJECTION INTRAVENOUS; SUBCUTANEOUS at 05:29

## 2017-11-27 RX ADMIN — HYDROMORPHONE HYDROCHLORIDE 1 MILLIGRAM(S): 2 INJECTION INTRAMUSCULAR; INTRAVENOUS; SUBCUTANEOUS at 00:15

## 2017-11-27 RX ADMIN — HEPARIN SODIUM 5000 UNIT(S): 5000 INJECTION INTRAVENOUS; SUBCUTANEOUS at 13:16

## 2017-11-27 RX ADMIN — HYDROMORPHONE HYDROCHLORIDE 1 MILLIGRAM(S): 2 INJECTION INTRAMUSCULAR; INTRAVENOUS; SUBCUTANEOUS at 00:30

## 2017-11-27 RX ADMIN — HEPARIN SODIUM 5000 UNIT(S): 5000 INJECTION INTRAVENOUS; SUBCUTANEOUS at 21:36

## 2017-11-27 RX ADMIN — NICARDIPINE HYDROCHLORIDE 25 MG/HR: 30 CAPSULE, EXTENDED RELEASE ORAL at 19:14

## 2017-11-27 RX ADMIN — HYDROMORPHONE HYDROCHLORIDE 1 MILLIGRAM(S): 2 INJECTION INTRAMUSCULAR; INTRAVENOUS; SUBCUTANEOUS at 17:30

## 2017-11-27 RX ADMIN — HYDROMORPHONE HYDROCHLORIDE 1 MILLIGRAM(S): 2 INJECTION INTRAMUSCULAR; INTRAVENOUS; SUBCUTANEOUS at 17:14

## 2017-11-27 RX ADMIN — NICARDIPINE HYDROCHLORIDE 25 MG/HR: 30 CAPSULE, EXTENDED RELEASE ORAL at 08:59

## 2017-11-27 NOTE — PROGRESS NOTE ADULT - SUBJECTIVE AND OBJECTIVE BOX
VASCULAR SURGERY PROGRESS NOTE        SUBJECTIVE:  Patient seen and examined at bedside. On pressors. Denies fever, CP, and SOB. Pain well controlled       Vital Signs Last 24 Hrs  T(C): 36.6 (2017 08:00), Max: 37.1 (2017 11:48)  T(F): 97.9 (2017 08:00), Max: 98.8 (2017 11:48)  HR: 60 (:) (59 - 70)  BP: 105/37 (2017 08:00) (105/37 - 210/63)  BP(mean): 54 (:) (54 - 103)  RR: 14 (:) (12 - 25)  SpO2: 99% (:) (97% - 100%)    Physical Exam  General: awake, alert, NAD   Pulm: respirations unlabored, no increased WOB  Extremities: b/l palpable DP/PT    I&O's Summary      -  2017 07:00  --------------------------------------------------------  IN: 1537.5 mL / OUT: 1200 mL / NET: 337.5 mL      -  2017 09:50  --------------------------------------------------------  IN: 125 mL / OUT: 0 mL / NET: 125 mL      I&O's Detail      -  2017 07:00  --------------------------------------------------------  IN:    Labetalol Infusion: 660 mL    niCARdipine Infusion: 377.5 mL    Other: 500 mL  Total IN: 1537.5 mL    OUT:    Other: 1200 mL  Total OUT: 1200 mL    Total NET: 337.5 mL      2017 07:01  -  2017 09:50  --------------------------------------------------------  IN:    niCARdipine Infusion: 125 mL  Total IN: 125 mL    OUT:  Total OUT: 0 mL    Total NET: 125 mL          MEDICATIONS  (STANDING):  aspirin  chewable 81 milliGRAM(s) Oral daily  calcitriol   Capsule 0.25 MICROGram(s) Oral daily  heparin  Injectable 5000 Unit(s) SubCutaneous every 8 hours  labetalol Injectable 10 milliGRAM(s) IV Push once  niCARdipine Infusion 5 mG/Hr (25 mL/Hr) IV Continuous <Continuous>    MEDICATIONS  (PRN):      LABS:                        8.2    3.80  )-----------( 154      ( 2017 02:20 )             25.3         137  |  94<L>  |  18  ----------------------------<  89  3.4<L>   |  30  |  3.82<H>    Ca    8.3<L>      2017 02:20  Phos  1.9       Mg     1.7         TPro  7.0  /  Alb  4.0  /  TBili  0.8  /  DBili  x   /  AST  9   /  ALT  4   /  AlkPhos  63      PT/INR - ( 2017 02:20 )   PT: 12.7 SEC;   INR: 1.13          PTT - ( 2017 02:20 )  PTT:37.6 SEC  Urinalysis Basic - ( 2017 14:11 )    Color: YELLOW / Appearance: CLEAR / S.013 / pH: 8.0  Gluc: NEGATIVE / Ketone: NEGATIVE  / Bili: NEGATIVE / Urobili: NORMAL E.U.   Blood: MODERATE / Protein: 100 / Nitrite: NEGATIVE   Leuk Esterase: MODERATE / RBC: 25-50 / WBC 25-50   Sq Epi: OCC / Non Sq Epi: x / Bacteria: x        RADIOLOGY & ADDITIONAL STUDIES:

## 2017-11-27 NOTE — PROGRESS NOTE ADULT - ASSESSMENT
82M with extensive cardiac history, Type B dissection s/p repair in 2013, now with severe back pain, found to have enlarging infrarenal AAA suspicious for impending rupture.     - SICU consult for arterial line placement and BP control < 110 SBP  - will contact CT surgery to discuss Type B dissection repair  - HD overnight, 700cc off  - Cards evaluation for preop risk stratification and AICD eval  - plan for EVAR in AM tentatively  - may need additional IV access    C TEAM [id number]

## 2017-11-27 NOTE — PROGRESS NOTE ADULT - SUBJECTIVE AND OBJECTIVE BOX
Nephrology Progress Note    No acute events overnight  Patient looks comfortable and not in distress  Denies chest pain or sob    Patient was emergently dialyzed last night in preparation for AAA surgery which was postponed.       PHYSICAL EXAM    Vital Signs Last 24 Hrs  T(C): 36.6 (27 Nov 2017 08:00), Max: 37.1 (26 Nov 2017 14:18)  T(F): 97.9 (27 Nov 2017 08:00), Max: 98.7 (26 Nov 2017 14:18)  HR: 61 (27 Nov 2017 11:00) (59 - 70)  BP: 105/37 (27 Nov 2017 08:00) (105/37 - 210/63)  BP(mean): 54 (27 Nov 2017 08:00) (54 - 103)  RR: 16 (27 Nov 2017 11:00) (12 - 25)  SpO2: 99% (27 Nov 2017 11:00) (97% - 100%)  I&O's Summary    26 Nov 2017 07:01  -  27 Nov 2017 07:00  --------------------------------------------------------  IN: 1537.5 mL / OUT: 1200 mL / NET: 337.5 mL    27 Nov 2017 07:01  -  27 Nov 2017 11:49  --------------------------------------------------------  IN: 195 mL / OUT: 0 mL / NET: 195 mL      GA: awake and alert, no distress  EYES: EOMI, clear conj, anicteric sclera  ENT: MMM, clear OP, neck supple  LUNGS: CTABL, no wrc, normal effort  HEART; RRR, no mrg, no peripheral edema  ABD; Soft, NT/ND/BS+, no peritoneal signs  EXT; well perfused, no edema or cyanosis  ; no ribeiro  NEURO: AAO x 3, sensation and motor intact  SKIN: warm and dry, no rash on visible skin       LABS                     8.2    3.80  )-----------( 154      ( 27 Nov 2017 02:20 )             25.3     11-27    137  |  94<L>  |  18  ----------------------------<  89  3.4<L>   |  30  |  3.82<H>    Ca    8.3<L>      27 Nov 2017 02:20  Phos  1.9     11-27  Mg     1.7     11-27    TPro  7.0  /  Alb  4.0  /  TBili  0.8  /  DBili  x   /  AST  9   /  ALT  4   /  AlkPhos  63  11-26        IMAGING: Reviewed and as per records: pertinent positives:     CT Angio Abdomen and Pelvis w/ IV Cont (11.26.17 @ 14:04) >  	VESSELS: No central pulmonary arterial embolism. Right subclavian artery   	stent which is patent. Patient is status post repair of a type B aortic   	dissection utilizing graft material. The graft repair is up to the region   	of the distal thoracic aorta.    	There is residual dissection flap within the abdominal aorta extending to   	both common iliac arteries. The true lumen is well opacified supplying   	the celiac, superior mesenteric and inferior mesenteric arteries  as well   	as both renal arteries which are well opacified without stenosis.. The   	true lumen opacified the right common iliac, external iliac and common   	femoral artery. It also opacified the left common iliac and left internal   	iliac artery. There is limited opacification of the false lumen within   	the abdominal aorta likely related to slow flow. The false lumen results   	in narrowing of the true lumen within the abdominal aorta and right   	common iliac and left common iliac arteries. There is nonopacification of   	the left external iliac and common femoral artery which are likely   	perfused by the false lumen and are not opacified related to slow flow.  	This could be further evaluated with repeat CTA with delayed images.    	There is residual aneurysmal dilatation of the abdominal aorta at the   	level of the hiatus which measures 4.1 x 3.3 cm, not significant changed.   	There is aneurysmal dilatation of the distal abdominal aorta measuring   	5.3 x 3.4 cm, previously measuring 2.4 x 2.5 cm. Focal outpouching is   	noted in the wall of the false lumen at that level. This place patient at   	risk for impending rupture.    	There are calcifications ofthe right common femoral and external iliac   	veins suggestive of sequela of prior thrombus.    	HEART: Cardiomegaly. No pericardial effusion. Left chest wall AICD with   	leads terminating in the right atrium, right ventricle and coronary sinus.  	MEDIASTINUM AND JEM: Small, subcentimeter short axis, mediastinal lymph   	nodes.  	CHEST WALL AND LOWER NECK: Within normal limits.      	IMPRESSION:     	Enlarging infrarenal abdominal aortic aneurysm as described above   	suspicious for impending rupture.    	Patient isstatus post repair of a type B aortic dissection with   	persistent abdominal aortic dissection. All major abdominal vessels   	appear to originate from the true lumen and are patent.    	There is nonopacification of the left external iliac and common femoral   	artery which are likely perfused by the false lumen and are not opacified   	related to slow flow. This could be further evaluated with repeat CTA   	with delayed images.    	Interval enlargement of a hyperdense lesion in the lower pole the right   	kidney. Consider for the evaluation with renal ultrasound if clinically   	indicated.          ASSESSMENT: Patient is a 82 year old male with PMHx of Type B aortic dissection s/p repair 2013 (at [place], Dr Ennis), ESRD on HD (last Th), AICD, MVR 2010, presents with severe back pain since Tuesday found to have enlarging infrarenal AAA suspicious for impending rupture. Patient is planned for emergent surgery and will need emergent dialysis. Patient presented with very high blood pressure; Hypertensive emergency; treated with labetalol drip and improved.       1. ESRD on HD TTS, last HD on Saturday through a right AVF on the upper arm.   2. Hypertensive emergency associated with impending AAA rupture and aortic dissection  3. Hyperkalemia of 5.4  4. Anemia likely of chronic disease  5. Impending AAA rupture; and dissection management per vascular surgery    RECOMMENDATIONS:  - patient was emergently dialyze last night in preparation for surgery which is held for now  - next treatment tomorrow if still inpatient otherwise he can follow with outpatient dialysis unit  - will continue MYA therapy with dialysis afterwardstitrated  - keep on low K diet  - dose meds per GFR less than 15 ml/min        Santhosh Mishra MD  [facility]  [phone]

## 2017-11-27 NOTE — PROGRESS NOTE ADULT - SUBJECTIVE AND OBJECTIVE BOX
SICU Daily Progress Note  =====================================================  Interval/Overnight Events:   Patient was switched from a labetalol drip to a nicardipine drip.  He also underwent HD overnight.    SICU Day #   1    HPI:  Patient is an 82y male with a PMH of ESRD, Aortic dissection type B s/p repair 2013 (Dr. Ennis), Pacemaker 2015, CAD, CHF, partial neprhrectomy for RCC (2006), MVR in 2010 (Dr. Oliveira), RUE AVF who presented to the hospital with severe upper back pain that started 5 days ago.  The pain is worse with movement.  He denies having any nausea, vomiting or abdominal pain.  In the ED he was hypertensive with SBP greater than 200s.  He was given IV labetalol several times along with hydralazine.    PMH:   PAST MEDICAL & SURGICAL HISTORY:  Mitral valve disorder  AAA   BPH   Osteoarthritis: left knee.  CKD (chronic kidney disease)  Aortic dissection  Pacemaker: 10/2010, BIV ICD upgrade in 9/2015  Renal Carcinoma  Intracranial Subdural Hematoma: 1991 - no deficits  Essential Hypertension  PHT (Pulmonary Hypertension)  CHF (Congestive Heart Failure)  CRF (chronic renal failure): AV fistula right upper arm inserted in 2016 May  Chronic kidney disease (CKD): pt s/p Right AVF, s/p Right First stage basilic vein transposition, Right AVF 2/16, 4/16  History of cystoscopy: cryoablation  of prostate 2014  Cataract, bilateral: right 3/2015, right  5/2015  H/O aortic aneurysm repair: 2013  Pacemaker: 2010 [employer] model #2210, with upgrade to AICD in 2015 [employer] model #KJ0610  Mitral valve replaced: Bioprosthetic - Dr. Roy 2010  S/P Craniotomy: 1991  S/P Nephrectomy: right partial nephrectomy 2006    Allergies: kiwi (Swelling)  No Known Drug Allergies      MEDICATIONS:   --------------------------------------------------------------------------------------  Neurologic Medications    Respiratory Medications    Cardiovascular Medications  niCARdipine Infusion 5 mG/Hr IV Continuous <Continuous>    Gastrointestinal Medications  calcitriol   Capsule 0.25 MICROGram(s) Oral daily    Genitourinary Medications    Hematologic/Oncologic Medications  aspirin  chewable 81 milliGRAM(s) Oral daily  heparin  Injectable 5000 Unit(s) SubCutaneous every 8 hours    Antimicrobial/Immunologic Medications    Endocrine/Metabolic Medications    Topical/Other Medications    --------------------------------------------------------------------------------------    VITAL SIGNS, INS/OUTS (last 24 hours):  --------------------------------------------------------------------------------------  ICU Vital Signs Last 24 Hrs  T(C): 36.4 (27 Nov 2017 00:00), Max: 37.1 (26 Nov 2017 11:48)  T(F): 97.6 (27 Nov 2017 00:00), Max: 98.8 (26 Nov 2017 11:48)  HR: 60 (27 Nov 2017 01:22) (59 - 70)  BP: 195/69 (26 Nov 2017 20:00) (155/78 - 210/63)  BP(mean): 103 (26 Nov 2017 20:00) (95 - 103)  ABP: 108/35 (27 Nov 2017 01:22) (103/36 - 206/79)  ABP(mean): 55 (27 Nov 2017 01:22) (54 - 126)  RR: 14 (27 Nov 2017 01:22) (12 - 21)  SpO2: 98% (27 Nov 2017 01:22) (97% - 100%)    --------------------------------------------------------------------------------------    EXAM  NEUROLOGY  Exam: Normal, NAD, alert, oriented x3, no focal deficits.    HEENT  Exam: Normocephalic, atraumatic, EOMI.     RESPIRATORY  Exam: Patient is not tachypnic and there is no accessory muscle use.    CARDIOVASCULAR  Exam: S1, S2.  Regular rate and rhythm (patient is paced)    GI/NUTRITION  Exam: Abdomen soft, Non-tender, Non-distended.   Current Diet:  NPO     VASCULAR  Exam: Extremities warm, pink    MUSCULOSKELETAL  Exam: All extremities moving spontaneously without limitations.      METABOLIC/FLUIDS/ELECTROLYTES  calcitriol   Capsule 0.25 MICROGram(s) Oral daily      HEMATOLOGIC  [x] VTE Prophylaxis: aspirin  chewable 81 milliGRAM(s) Oral daily  heparin  Injectable 5000 Unit(s) SubCutaneous every 8 hours      Tubes/Lines/Drains  [x] Peripheral IV  [] Central Venous Line     	[] R	[] L	[] IJ	[] Fem	[] SC	Date Placed:   [] Arterial Line		[] R	[x] L	[] Fem	[x] Rad	[] Ax	Date Placed:   [] PICC		[] Midline		[] Mediport  [] Urinary Catheter		Date Placed:   [x] Necessity of urinary, arterial, and venous catheters discussed    LABS  --------------------------------------------------------------------------------------  ICU Vital Signs Last 24 Hrs  T(C): 36.4 (27 Nov 2017 00:00), Max: 37.1 (26 Nov 2017 11:48)  T(F): 97.6 (27 Nov 2017 00:00), Max: 98.8 (26 Nov 2017 11:48)  HR: 60 (27 Nov 2017 01:22) (59 - 70)  BP: 195/69 (26 Nov 2017 20:00) (155/78 - 210/63)  BP(mean): 103 (26 Nov 2017 20:00) (95 - 103)  ABP: 108/35 (27 Nov 2017 01:22) (103/36 - 206/79)  ABP(mean): 55 (27 Nov 2017 01:22) (54 - 126)  RR: 14 (27 Nov 2017 01:22) (12 - 21)  SpO2: 98% (27 Nov 2017 01:22) (97% - 100%)    --------------------------------------------------------------------------------------    OTHER LABORATORY:     IMAGING RESULTS  CT: Enlarging infrarenal abdominal aortic aneurysm as described above   suspicious for impending rupture.    Patient is status post repair of a type B aortic dissection with   persistent abdominal aortic dissection. All major abdominal vessels   appear to originate from the true lumen and are patent.    There is nonopacification of the left external iliac and common femoral   artery which are likely perfused by the false lumen and are not opacified   related to slow flow. This could be further evaluated with repeat CTA   with delayed images.    Interval enlargement of a hyperdense lesion in the lower pole the right   kidney. Consider for the evaluation with renal ultrasound if clinically   indicated.    ASSESSMENT:  Patient is an 82 year old male with Past Surgical History of type B aortic dissection and surgical correction who has an impending AAA rupture.    PLAN:  Neurologic:  Neurological checks to assess for acute changes.  Tylenol and for pain control  Respiratory: Maintain O2 saturation greater than 92% with NC as needed.  Cardiovascular: Maintain SBP less than 110.  NIcardipine gtt titrated to meet this goal.  OR today for operative correction of AAA.  Gastrointestinal/Nutrition: Will keep patient NPO for operation.  Renal/Genitourinary: follow up Renal recs.   Hematologic: SQH for VTE px.  Continue home dose of aspirin 81.  Infectious Disease: No need for abx at this time.  Follow up WBC.  Tubes/Lines/Drains: left radial a-line, peripheral IV  Endocrine: F/U AM BMP glucose.  Disposition:  SICU, OR today.      --------------------------------------------------------------------------------------    Critical Care Diagnoses: SICU Daily Progress Note  =====================================================  Interval/Overnight Events:   Patient was switched from a labetalol drip to a nicardipine drip.  He also underwent HD overnight, 700mL fluid removed.     SICU Day #   1    HPI:  Patient is an 82y male with a PMH of ESRD, Aortic dissection type B s/p repair 2013 (Dr. Ennis), Pacemaker 2015, CAD, CHF, partial neprhrectomy for RCC (2006), MVR in 2010 (Dr. Oliveira), RUE AVF who presented to the hospital with severe upper back pain that started 5 days ago.  The pain is worse with movement.  He denies having any nausea, vomiting or abdominal pain.  In the ED he was hypertensive with SBP greater than 200s.  He was given IV labetalol several times along with hydralazine.    PMH:   PAST MEDICAL & SURGICAL HISTORY:  Mitral valve disorder  AAA   BPH   Osteoarthritis: left knee.  CKD (chronic kidney disease)  Aortic dissection  Pacemaker: 10/2010, BIV ICD upgrade in 9/2015  Renal Carcinoma  Intracranial Subdural Hematoma: 1991 - no deficits  Essential Hypertension  PHT (Pulmonary Hypertension)  CHF (Congestive Heart Failure)  CRF (chronic renal failure): AV fistula right upper arm inserted in 2016 May  Chronic kidney disease (CKD): pt s/p Right AVF, s/p Right First stage basilic vein transposition, Right AVF 2/16, 4/16  History of cystoscopy: cryoablation  of prostate 2014  Cataract, bilateral: right 3/2015, right  5/2015  H/O aortic aneurysm repair: 2013  Pacemaker: 2010 [employer] model #2210, with upgrade to AICD in 2015 [employer] model #[id number]  Mitral valve replaced: Bioprosthetic - Dr. Roy 2010  S/P Craniotomy: 1991  S/P Nephrectomy: right partial nephrectomy 2006    Allergies: kiwi (Swelling)  No Known Drug Allergies      MEDICATIONS:   --------------------------------------------------------------------------------------  Neurologic Medications    Respiratory Medications    Cardiovascular Medications  niCARdipine Infusion 5 mG/Hr IV Continuous <Continuous>    Gastrointestinal Medications  calcitriol   Capsule 0.25 MICROGram(s) Oral daily    Genitourinary Medications    Hematologic/Oncologic Medications  aspirin  chewable 81 milliGRAM(s) Oral daily  heparin  Injectable 5000 Unit(s) SubCutaneous every 8 hours    Antimicrobial/Immunologic Medications    Endocrine/Metabolic Medications    Topical/Other Medications    --------------------------------------------------------------------------------------    VITAL SIGNS, INS/OUTS (last 24 hours):  --------------------------------------------------------------------------------------  ICU Vital Signs Last 24 Hrs  T(C): 36.4 (27 Nov 2017 00:00), Max: 37.1 (26 Nov 2017 11:48)  T(F): 97.6 (27 Nov 2017 00:00), Max: 98.8 (26 Nov 2017 11:48)  HR: 60 (27 Nov 2017 01:22) (59 - 70)  BP: 195/69 (26 Nov 2017 20:00) (155/78 - 210/63)  BP(mean): 103 (26 Nov 2017 20:00) (95 - 103)  ABP: 108/35 (27 Nov 2017 01:22) (103/36 - 206/79)  ABP(mean): 55 (27 Nov 2017 01:22) (54 - 126)  RR: 14 (27 Nov 2017 01:22) (12 - 21)  SpO2: 98% (27 Nov 2017 01:22) (97% - 100%)    --------------------------------------------------------------------------------------    EXAM  NEUROLOGY  Exam: Normal, NAD, alert, oriented x3, no focal deficits.    HEENT  Exam: Normocephalic, atraumatic, EOMI.     RESPIRATORY  Exam: Patient is not tachypnic and there is no accessory muscle use.    CARDIOVASCULAR  Exam: S1, S2.  Regular rate and rhythm (patient is paced)    GI/NUTRITION  Exam: Abdomen soft, Non-tender, Non-distended.   Current Diet:  NPO     VASCULAR  Exam: Extremities warm, pink    MUSCULOSKELETAL  Exam: All extremities moving spontaneously without limitations.      METABOLIC/FLUIDS/ELECTROLYTES  calcitriol   Capsule 0.25 MICROGram(s) Oral daily      HEMATOLOGIC  [x] VTE Prophylaxis: aspirin  chewable 81 milliGRAM(s) Oral daily  heparin  Injectable 5000 Unit(s) SubCutaneous every 8 hours      Tubes/Lines/Drains  [x] Peripheral IV  [] Central Venous Line     	[] R	[] L	[] IJ	[] Fem	[] SC	Date Placed:   [] Arterial Line		[] R	[x] L	[] Fem	[x] Rad	[] Ax	Date Placed:   [] PICC		[] Midline		[] Mediport  [] Urinary Catheter		Date Placed:   [x] Necessity of urinary, arterial, and venous catheters discussed    LABS  --------------------------------------------------------------------------------------  ICU Vital Signs Last 24 Hrs  T(C): 36.4 (27 Nov 2017 00:00), Max: 37.1 (26 Nov 2017 11:48)  T(F): 97.6 (27 Nov 2017 00:00), Max: 98.8 (26 Nov 2017 11:48)  HR: 60 (27 Nov 2017 01:22) (59 - 70)  BP: 195/69 (26 Nov 2017 20:00) (155/78 - 210/63)  BP(mean): 103 (26 Nov 2017 20:00) (95 - 103)  ABP: 108/35 (27 Nov 2017 01:22) (103/36 - 206/79)  ABP(mean): 55 (27 Nov 2017 01:22) (54 - 126)  RR: 14 (27 Nov 2017 01:22) (12 - 21)  SpO2: 98% (27 Nov 2017 01:22) (97% - 100%)    --------------------------------------------------------------------------------------    OTHER LABORATORY:     IMAGING RESULTS  CT: Enlarging infrarenal abdominal aortic aneurysm as described above   suspicious for impending rupture.    Patient is status post repair of a type B aortic dissection with   persistent abdominal aortic dissection. All major abdominal vessels   appear to originate from the true lumen and are patent.    There is nonopacification of the left external iliac and common femoral   artery which are likely perfused by the false lumen and are not opacified   related to slow flow. This could be further evaluated with repeat CTA   with delayed images.    Interval enlargement of a hyperdense lesion in the lower pole the right   kidney. Consider for the evaluation with renal ultrasound if clinically   indicated.    ASSESSMENT:  Patient is an 82 year old male with Past Surgical History of type B aortic dissection and surgical correction who has an impending AAA rupture.    PLAN:  Neurologic:  Neurological checks to assess for acute changes.  Tylenol PRN for pain control  Respiratory: Stable on Room Air  Cardiovascular: Goal SBP <120, titrate NIcardipine gtt to meet this goal.  F/u plan for OR today for operative correction of AAA.  Gastrointestinal/Nutrition: Will keep patient NPO for operation.  Renal/Genitourinary: ESRD on HD per nephrology, had HD yesterday.   Hematologic: SQH for VTE px.  Continue home dose of aspirin 81.  Infectious Disease: No need for abx at this time.  Follow up WBC.  Tubes/Lines/Drains: left radial a-line, peripheral IV  Endocrine: F/U AM BMP glucose.  Disposition:  SICU, OR today.      --------------------------------------------------------------------------------------    Critical Care Diagnoses:

## 2017-11-28 DIAGNOSIS — M79.1 MYALGIA: ICD-10-CM

## 2017-11-28 LAB
BACTERIA UR CULT: SIGNIFICANT CHANGE UP
BASOPHILS # BLD AUTO: 0.02 K/UL — SIGNIFICANT CHANGE UP (ref 0–0.2)
BASOPHILS NFR BLD AUTO: 0.5 % — SIGNIFICANT CHANGE UP (ref 0–2)
BUN SERPL-MCNC: 38 MG/DL — HIGH (ref 7–23)
CALCIUM SERPL-MCNC: 8 MG/DL — LOW (ref 8.4–10.5)
CHLORIDE SERPL-SCNC: 95 MMOL/L — LOW (ref 98–107)
CK SERPL-CCNC: 206 U/L — HIGH (ref 30–200)
CK SERPL-CCNC: 214 U/L — HIGH (ref 30–200)
CO2 SERPL-SCNC: 23 MMOL/L — SIGNIFICANT CHANGE UP (ref 22–31)
CREAT SERPL-MCNC: 7.06 MG/DL — HIGH (ref 0.5–1.3)
EOSINOPHIL # BLD AUTO: 0.04 K/UL — SIGNIFICANT CHANGE UP (ref 0–0.5)
EOSINOPHIL NFR BLD AUTO: 1.1 % — SIGNIFICANT CHANGE UP (ref 0–6)
GLUCOSE SERPL-MCNC: 84 MG/DL — SIGNIFICANT CHANGE UP (ref 70–99)
HCT VFR BLD CALC: 25 % — LOW (ref 39–50)
HGB BLD-MCNC: 7.8 G/DL — LOW (ref 13–17)
IMM GRANULOCYTES # BLD AUTO: 0.01 # — SIGNIFICANT CHANGE UP
IMM GRANULOCYTES NFR BLD AUTO: 0.3 % — SIGNIFICANT CHANGE UP (ref 0–1.5)
LYMPHOCYTES # BLD AUTO: 0.53 K/UL — LOW (ref 1–3.3)
LYMPHOCYTES # BLD AUTO: 14.4 % — SIGNIFICANT CHANGE UP (ref 13–44)
MAGNESIUM SERPL-MCNC: 1.9 MG/DL — SIGNIFICANT CHANGE UP (ref 1.6–2.6)
MCHC RBC-ENTMCNC: 26.2 PG — LOW (ref 27–34)
MCHC RBC-ENTMCNC: 31.2 % — LOW (ref 32–36)
MCV RBC AUTO: 83.9 FL — SIGNIFICANT CHANGE UP (ref 80–100)
MONOCYTES # BLD AUTO: 0.48 K/UL — SIGNIFICANT CHANGE UP (ref 0–0.9)
MONOCYTES NFR BLD AUTO: 13 % — SIGNIFICANT CHANGE UP (ref 2–14)
NEUTROPHILS # BLD AUTO: 2.61 K/UL — SIGNIFICANT CHANGE UP (ref 1.8–7.4)
NEUTROPHILS NFR BLD AUTO: 70.7 % — SIGNIFICANT CHANGE UP (ref 43–77)
NRBC # FLD: 0 — SIGNIFICANT CHANGE UP
PHOSPHATE SERPL-MCNC: 5.3 MG/DL — HIGH (ref 2.5–4.5)
PLATELET # BLD AUTO: 181 K/UL — SIGNIFICANT CHANGE UP (ref 150–400)
PMV BLD: 10.4 FL — SIGNIFICANT CHANGE UP (ref 7–13)
POTASSIUM SERPL-MCNC: 5 MMOL/L — SIGNIFICANT CHANGE UP (ref 3.5–5.3)
POTASSIUM SERPL-SCNC: 5 MMOL/L — SIGNIFICANT CHANGE UP (ref 3.5–5.3)
RBC # BLD: 2.98 M/UL — LOW (ref 4.2–5.8)
RBC # FLD: 16.5 % — HIGH (ref 10.3–14.5)
SODIUM SERPL-SCNC: 138 MMOL/L — SIGNIFICANT CHANGE UP (ref 135–145)
SPECIMEN SOURCE: SIGNIFICANT CHANGE UP
TROPONIN T SERPL-MCNC: 0.11 NG/ML — HIGH (ref 0–0.06)
TROPONIN T SERPL-MCNC: 0.11 NG/ML — HIGH (ref 0–0.06)
WBC # BLD: 3.69 K/UL — LOW (ref 3.8–10.5)
WBC # FLD AUTO: 3.69 K/UL — LOW (ref 3.8–10.5)

## 2017-11-28 PROCEDURE — 93010 ELECTROCARDIOGRAM REPORT: CPT

## 2017-11-28 PROCEDURE — 99232 SBSQ HOSP IP/OBS MODERATE 35: CPT

## 2017-11-28 PROCEDURE — 99222 1ST HOSP IP/OBS MODERATE 55: CPT | Mod: GC

## 2017-11-28 RX ORDER — LABETALOL HCL 100 MG
200 TABLET ORAL THREE TIMES A DAY
Qty: 0 | Refills: 0 | Status: DISCONTINUED | OUTPATIENT
Start: 2017-11-28 | End: 2017-11-28

## 2017-11-28 RX ORDER — KETOROLAC TROMETHAMINE 30 MG/ML
15 SYRINGE (ML) INJECTION ONCE
Qty: 0 | Refills: 0 | Status: DISCONTINUED | OUTPATIENT
Start: 2017-11-28 | End: 2017-11-28

## 2017-11-28 RX ORDER — LABETALOL HCL 100 MG
200 TABLET ORAL THREE TIMES A DAY
Qty: 0 | Refills: 0 | Status: DISCONTINUED | OUTPATIENT
Start: 2017-11-28 | End: 2017-11-29

## 2017-11-28 RX ORDER — ERYTHROPOIETIN 10000 [IU]/ML
10000 INJECTION, SOLUTION INTRAVENOUS; SUBCUTANEOUS ONCE
Qty: 0 | Refills: 0 | Status: COMPLETED | OUTPATIENT
Start: 2017-11-28 | End: 2017-11-28

## 2017-11-28 RX ORDER — SIMETHICONE 80 MG/1
80 TABLET, CHEWABLE ORAL ONCE
Qty: 0 | Refills: 0 | Status: COMPLETED | OUTPATIENT
Start: 2017-11-28 | End: 2017-11-28

## 2017-11-28 RX ORDER — LIDOCAINE 4 G/100G
1 CREAM TOPICAL DAILY
Qty: 0 | Refills: 0 | Status: DISCONTINUED | OUTPATIENT
Start: 2017-11-28 | End: 2017-11-29

## 2017-11-28 RX ORDER — ACETAMINOPHEN 500 MG
650 TABLET ORAL EVERY 6 HOURS
Qty: 0 | Refills: 0 | Status: DISCONTINUED | OUTPATIENT
Start: 2017-11-28 | End: 2017-11-29

## 2017-11-28 RX ADMIN — HEPARIN SODIUM 5000 UNIT(S): 5000 INJECTION INTRAVENOUS; SUBCUTANEOUS at 05:12

## 2017-11-28 RX ADMIN — Medication 200 MILLIGRAM(S): at 09:08

## 2017-11-28 RX ADMIN — Medication 200 MILLIGRAM(S): at 13:33

## 2017-11-28 RX ADMIN — Medication 15 MILLIGRAM(S): at 17:37

## 2017-11-28 RX ADMIN — SIMETHICONE 80 MILLIGRAM(S): 80 TABLET, CHEWABLE ORAL at 06:17

## 2017-11-28 RX ADMIN — HEPARIN SODIUM 5000 UNIT(S): 5000 INJECTION INTRAVENOUS; SUBCUTANEOUS at 13:33

## 2017-11-28 RX ADMIN — ERYTHROPOIETIN 10000 UNIT(S): 10000 INJECTION, SOLUTION INTRAVENOUS; SUBCUTANEOUS at 23:37

## 2017-11-28 RX ADMIN — Medication 15 MILLIGRAM(S): at 17:53

## 2017-11-28 RX ADMIN — Medication 81 MILLIGRAM(S): at 09:12

## 2017-11-28 NOTE — PROGRESS NOTE ADULT - SUBJECTIVE AND OBJECTIVE BOX
SICU Daily Progress Note  =====================================================  Interval/Overnight Events:     Patient went to CT scan overnight, prelim read reveals stable dissection flap, patient without back pain overnight.  He remained on nicardipine gtt overnight and started on po labetalol    HPI:     PMH:   ***    Allergies: kiwi (Swelling)  No Known Drug Allergies      MEDICATIONS:   --------------------------------------------------------------------------------------  Neurologic Medications    Respiratory Medications    Cardiovascular Medications  labetalol 200 milliGRAM(s) Oral <User Schedule>  niCARdipine Infusion 5 mG/Hr IV Continuous <Continuous>    Gastrointestinal Medications  calcitriol   Capsule 0.25 MICROGram(s) Oral daily    Genitourinary Medications    Hematologic/Oncologic Medications  aspirin  chewable 81 milliGRAM(s) Oral <User Schedule>  heparin  Injectable 5000 Unit(s) SubCutaneous every 8 hours    Antimicrobial/Immunologic Medications    Endocrine/Metabolic Medications    Topical/Other Medications    --------------------------------------------------------------------------------------    VITAL SIGNS, INS/OUTS (last 24 hours):  --------------------------------------------------------------------------------------  ((Insert SICU Vitals/Is+Os here))***  --------------------------------------------------------------------------------------    EXAM  NEUROLOGY  Exam: Normal, NAD, alert, oriented x3, no focal deficits.    HEENT  Exam: Normocephalic, atraumatic, EOMI.      RESPIRATORY  Exam: non labored breathing    CARDIOVASCULAR  Exam: S1, S2.  Regular rate and rhythm.      GI/NUTRITION  Exam: Abdomen soft, Non-tender, Non-distended    VASCULAR  Exam: Extremities warm, pink, well-perfused.     MUSCULOSKELETAL  Exam: All extremities moving spontaneously without limitations.     SKIN  Exam: Good skin turgor, no skin breakdown.     METABOLIC/FLUIDS/ELECTROLYTES  calcitriol   Capsule 0.25 MICROGram(s) Oral daily      HEMATOLOGIC  [x] VTE Prophylaxis: aspirin  chewable 81 milliGRAM(s) Oral <User Schedule>  heparin  Injectable 5000 Unit(s) SubCutaneous every 8 hours    Transfusions:	[] PRBC	[] Platelets		[] FFP	[] Cryoprecipitate    INFECTIOUS DISEASE -    Tubes/Lines/Drains    [x] Peripheral IV  [] Central Venous Line     	[] R	[] L	[] IJ	[] Fem	[] SC	Date Placed:   [] Arterial Line		[] R	[] L	[] Fem	[] Rad	[] Ax	Date Placed:   [] PICC		[] Midline		[] Mediport  [] Urinary Catheter		Date Placed:   [x] Necessity of urinary, arterial, and venous catheters discussed    LABS  --------------------------------------------------------------------------------------  ((Insert SICU Labs here))***  --------------------------------------------------------------------------------------    OTHER LABORATORY:     IMAGING STUDIES: Pending official read SICU Daily Progress Note  =====================================================  Interval/Overnight Events:     Patient went to CT scan overnight, prelim read reveals stable dissection flap, patient without back pain overnight.  He remained on nicardipine gtt overnight and started on po labetalol, at 540 am patient complained of right sided chest pain, he says he usually has this dull pain on his left sided relieved by gas x.    HPI:  Patient is an 82y male with a PMH of ESRD, Aortic dissection type B s/p repair 2013 (Dr. Ennis), Pacemaker 2015, CAD, CHF, partial neprhrectomy for RCC (2006), MVR in 2010 (Dr. Oliveira), RUE AVF who presented to the hospital with severe upper back pain that started 5 days ago.  The pain is worse with movement.  He denies having any nausea, vomiting or abdominal pain.  In the ED he was hypertensive with SBP greater than 200s.  He was given IV labetalol several times along with hydralazine.    Allergies: kiwi (Swelling)  No Known Drug Allergies      MEDICATIONS:   --------------------------------------------------------------------------------------  Neurologic Medications    Respiratory Medications    Cardiovascular Medications  labetalol 200 milliGRAM(s) Oral <User Schedule>  niCARdipine Infusion 5 mG/Hr IV Continuous <Continuous>    Gastrointestinal Medications  calcitriol   Capsule 0.25 MICROGram(s) Oral daily    Genitourinary Medications    Hematologic/Oncologic Medications  aspirin  chewable 81 milliGRAM(s) Oral <User Schedule>  heparin  Injectable 5000 Unit(s) SubCutaneous every 8 hours    Antimicrobial/Immunologic Medications    Endocrine/Metabolic Medications    Topical/Other Medications    --------------------------------------------------------------------------------------    VITAL SIGNS, INS/OUTS (last 24 hours):  --------------------------------------------------------------------------------------  ((Insert SICU Vitals/Is+Os here))***  --------------------------------------------------------------------------------------    EXAM  NEUROLOGY  Exam: Normal, NAD, alert, oriented x3, no focal deficits.    HEENT  Exam: Normocephalic, atraumatic, EOMI.      RESPIRATORY  Exam: non labored breathing    CARDIOVASCULAR  Exam: S1, S2.  Regular rate and rhythm.      GI/NUTRITION  Exam: Abdomen soft, Non-tender, Non-distended    VASCULAR  Exam: Extremities warm, pink, well-perfused.     MUSCULOSKELETAL  Exam: All extremities moving spontaneously without limitations.     SKIN  Exam: Good skin turgor, no skin breakdown.     METABOLIC/FLUIDS/ELECTROLYTES  calcitriol   Capsule 0.25 MICROGram(s) Oral daily      HEMATOLOGIC  [x] VTE Prophylaxis: aspirin  chewable 81 milliGRAM(s) Oral <User Schedule>  heparin  Injectable 5000 Unit(s) SubCutaneous every 8 hours    Transfusions:	[] PRBC	[] Platelets		[] FFP	[] Cryoprecipitate    INFECTIOUS DISEASE -    Tubes/Lines/Drains    [x] Peripheral IV  [] Central Venous Line     	[] R	[] L	[] IJ	[] Fem	[] SC	Date Placed:   [] Arterial Line		[] R	[] L	[] Fem	[] Rad	[] Ax	Date Placed:   [] PICC		[] Midline		[] Mediport  [] Urinary Catheter		Date Placed:   [x] Necessity of urinary, arterial, and venous catheters discussed    LABS  --------------------------------------------------------------------------------------  ((Insert SICU Labs here))***  --------------------------------------------------------------------------------------    OTHER LABORATORY:     IMAGING STUDIES: Pending official read SICU Daily Progress Note  =====================================================  Interval/Overnight Events:     Patient went to CT scan overnight, prelim read reveals stable dissection flap, patient without back pain overnight.  He remained on nicardipine gtt overnight and started on po labetalol, at 540 am patient complained of right sided chest pain, he says he usually has this dull pain on his left sided relieved by gas x.    HPI:  Patient is an 82y male with a PMH of ESRD, Aortic dissection type B s/p repair 2013 (Dr. Ennis), Pacemaker 2015, CAD, CHF, partial neprhrectomy for RCC (2006), MVR in 2010 (Dr. Oliveira), RUE AVF who presented to the hospital with severe upper back pain that started 5 days ago.  The pain is worse with movement.  He denies having any nausea, vomiting or abdominal pain.  In the ED he was hypertensive with SBP greater than 200s.  He was given IV labetalol several times along with hydralazine.    Allergies: kiwi (Swelling)  No Known Drug Allergies      MEDICATIONS:   --------------------------------------------------------------------------------------  Neurologic Medications    Respiratory Medications    Cardiovascular Medications  labetalol 200 milliGRAM(s) Oral <User Schedule>  niCARdipine Infusion 5 mG/Hr IV Continuous <Continuous>    Gastrointestinal Medications  calcitriol   Capsule 0.25 MICROGram(s) Oral daily    Genitourinary Medications    Hematologic/Oncologic Medications  aspirin  chewable 81 milliGRAM(s) Oral <User Schedule>  heparin  Injectable 5000 Unit(s) SubCutaneous every 8 hours    Antimicrobial/Immunologic Medications    Endocrine/Metabolic Medications    Topical/Other Medications    --------------------------------------------------------------------------------------    VITAL SIGNS, INS/OUTS (last 24 hours):  --------------------------------------------------------------------------------------  T(C): 36.7 (11-28-17 @ 08:00), Max: 37.1 (11-27-17 @ 16:00)  HR: 61 (11-28-17 @ 11:00) (59 - 67)  BP: --  BP(mean): --  ABP: 115/44 (11-28-17 @ 11:00) (98/33 - 127/45)  ABP(mean): 65 (11-28-17 @ 11:00) (51 - 67)  RR: 19 (11-28-17 @ 11:00) (14 - 22)  SpO2: 99% (11-28-17 @ 11:00) (95% - 100%)  Wt(kg): --  CVP(mm Hg): --  CI: --  CAPILLARY BLOOD GLUCOSE       N/A      11-27 @ 07:01  -  11-28 @ 07:00  --------------------------------------------------------  IN:    niCARdipine Infusion: 125 mL    niCARdipine Infusion: 865 mL    Oral Fluid: 30 mL  Total IN: 1020 mL    OUT:  Total OUT: 0 mL    Total NET: 1020 mL        --------------------------------------------------------------------------------------    EXAM  NEUROLOGY  Exam: Normal, NAD, alert, oriented x3, no focal deficits.    HEENT  Exam: Normocephalic, atraumatic, EOMI.      RESPIRATORY  Exam: non labored breathing    CARDIOVASCULAR  Exam: S1, S2.  Regular rate and rhythm.      GI/NUTRITION  Exam: Abdomen soft, Non-tender, Non-distended    VASCULAR  Exam: Extremities warm, pink, well-perfused.     MUSCULOSKELETAL  Exam: All extremities moving spontaneously without limitations.     SKIN  Exam: Good skin turgor, no skin breakdown.     METABOLIC/FLUIDS/ELECTROLYTES  calcitriol   Capsule 0.25 MICROGram(s) Oral daily      HEMATOLOGIC  [x] VTE Prophylaxis: aspirin  chewable 81 milliGRAM(s) Oral <User Schedule>  heparin  Injectable 5000 Unit(s) SubCutaneous every 8 hours    Transfusions:	[] PRBC	[] Platelets		[] FFP	[] Cryoprecipitate    INFECTIOUS DISEASE -    Tubes/Lines/Drains    [x] Peripheral IV  [] Central Venous Line     	[] R	[] L	[] IJ	[] Fem	[] SC	Date Placed:   [] Arterial Line		[] R	[] L	[] Fem	[] Rad	[] Ax	Date Placed:   [] PICC		[] Midline		[] Mediport  [] Urinary Catheter		Date Placed:   [x] Necessity of urinary, arterial, and venous catheters discussed    LABS  --------------------------------------------------------------------------------------  CBC (11-28 @ 02:18)                              7.8<L>                         3.69<L>  )----------------(  181        70.7  % Neutrophils, 14.4  % Lymphocytes, ANC: 2.61                                25.0<L>              CBC (11-27 @ 02:20)                              8.2<L>                         3.80    )----------------(  154        73.1  % Neutrophils, 11.6<L>% Lymphocytes, ANC: 2.78                                25.3<L>                BMP (11-28 @ 02:18)             138     |  95<L>   |  38<H> 		Ca++ --      Ca 8.0<L>             ---------------------------------( 84    		Mg 1.9                5.0     |  23      |  7.06<H>			Ph 5.3<H>  BMP (11-27 @ 02:20)             137     |  94<L>   |  18    		Ca++ --      Ca 8.3<L>             ---------------------------------( 89    		Mg 1.7                3.4<L>  |  30      |  3.82<H>			Ph 1.9<L>      Coags (11-27 @ 02:20)  aPTT 37.6<H> / INR 1.13 / PT 12.7        -> URINE MIDSTREAM Culture (11-26 @ 18:57)     NG    NG  NG      --------------------------------------------------------------------------------------    OTHER LABORATORY:     IMAGING STUDIES: Pending official read

## 2017-11-28 NOTE — PROGRESS NOTE ADULT - ASSESSMENT
82M with extensive cardiac history, Type B dissection s/p repair in 2013, now with severe back pain, found to have enlarging infrarenal AAA suspicious for impending rupture.     - Goal SBP < 120 mmHg  - D/w CT surgery to discuss Type B dissection repair  - Cards evaluation for preop risk stratification and AICD eval  - EVAR planning  - Lines: radial A-line, peripheral IV, may need additional IV access    Clarke Soliz, PGY-1  C Team Surgery [id number]

## 2017-11-28 NOTE — PROGRESS NOTE ADULT - ASSESSMENT
Patient is an 82 year old male with Past Surgical History of type B aortic dissection and surgical correction with Enlarging infrarenal abdominal aortic aneurysm.    PLAN:  Neurologic:  Neurological checks to assess for acute changes.  Tylenol PRN for pain control  Respiratory: Patient satting well on room air  Cardiovascular: Goal SBP <120, titrate Nicardipine gtt, continue labetalol  Gastrointestinal/Nutrition: Diet as per primary team  Renal/Genitourinary: ESRD on HD per nephrology last HD Sunday.  Hematologic: SQH for VTE px.  Continue home dose of aspirin 81.  Infectious Disease: No need for abx at this time.  Follow up WBC.  Tubes/Lines/Drains: left radial a-line, peripheral IV  Endocrine: F/U AM BMP glucose.  Disposition:  SICU Patient is an 82 year old male with Past Surgical History of type B aortic dissection and surgical correction with Enlarging infrarenal abdominal aortic aneurysm.    PLAN:  Neurologic:  Neurological checks to assess for acute changes.  Tylenol PRN for pain control  Respiratory: Patient satting well on room air  Cardiovascular: Goal SBP <120, titrate Nicardipine gtt, continue labetalol, check trops check ekg  Gastrointestinal/Nutrition: Diet as per primary team, gas x  Renal/Genitourinary: ESRD on HD per nephrology last HD Sunday.  Hematologic: SQH for VTE px.  Continue home dose of aspirin 81.  Infectious Disease: No need for abx at this time.  Follow up WBC.  Tubes/Lines/Drains: left radial a-line, peripheral IV  Endocrine: F/U AM BMP glucose.  Disposition:  SICU Patient is an 82 year old male with Past Surgical History of type B aortic dissection and surgical correction with Enlarging infrarenal abdominal aortic aneurysm.    PLAN:  Neurologic:  Neurological checks to assess for acute changes.  Tylenol PRN for pain control  Respiratory: Patient satting well on room air  Cardiovascular: Goal SBP <120, off nicardipine gtt. Home labetalol restarted.   Gastrointestinal/Nutrition: Will advance diet per vascular, regular as tolerated (DASH/Renal)  Renal/Genitourinary: ESRD on HD per nephrology last HD Sunday.  Hematologic: SQH for VTE px.  Continue home dose of aspirin 81.  Infectious Disease: No need for abx at this time.  Follow up WBC.  Tubes/Lines/Drains: PIV. NANCY Saez  Endocrine: F/U AM BMP glucose.  Disposition:  Transfer to Floor

## 2017-11-28 NOTE — CONSULT NOTE ADULT - SUBJECTIVE AND OBJECTIVE BOX
Patient is a 82 year old male with PMHx of Type B aortic dissection s/p repair  (at [place], Dr Ennis), ESRD on HD (last ), AICD, MVR , presents with severe back pain since Tuesday.  Denies having any pain like this ever before.  Pain is limited to upper back, worse with moving. Denies any LE pain, is ambulatory with assistance. Patient went to CT scan overnight, prelim read reveals stable dissection flap, patient without back pain overnight.       PMH  Aortic dissection  Pacemaker/AICD  Renal Carcinoma  BPH (benign prostatic hypertrophy)  Osteoarthritis  ESRD  Intracranial Subdural Hematoma  HTN  PHT (Pulmonary Hypertension)  CHF (Congestive Heart Failure)    PSH  AVF superficialization (RUE basilic vein transposition)   AICD   R nephrectomy 2006  Cataract, bilateral  Aortic dissection repair   MVR   Craniotomy for SDH       MEDS  Labetalol 200 mg TID  ASA 81 mg  Calcitriol 0.25 mcg    Allergies  kiwi (Swelling)  No Known Drug Allergies (2017 18:04)      REVIEW OF SYSTEMS: No chest pain, shortness of breath, nausea, vomiting or diarhea.      PAST MEDICAL & SURGICAL HISTORY  Mitral valve disorder  AAA (abdominal aortic aneurysm)  BPH (benign prostatic hypertrophy)  Osteoarthritis  CKD (chronic kidney disease)  Aortic dissection  Pacemaker  Renal Carcinoma  Intracranial Subdural Hematoma  Essential Hypertension  PHT (Pulmonary Hypertension)  CHF (Congestive Heart Failure)  Aortic dissection distal to left subclavian  CRF (chronic renal failure)  Chronic kidney disease (CKD)  Chronic kidney disease (CKD)  History of cystoscopy  Cataract, bilateral  H/O aortic aneurysm repair  Pacemaker  Mitral valve replaced  S/P Craniotomy  History of Nephrectomy  S/P Craniotomy  S/P Nephrectomy      SOCIAL HISTORY  Smoking - Denied, EtOH - Denied, Drugs - Denied    FUNCTIONAL HISTORY:   Lives   Independent    CURRENT FUNCTIONAL STATUS:      FAMILY HISTORY   Family history of aortic dissection (Sibling)  Family history of stroke (Sibling)  Family history of heart failure (Sibling)  Hypertension (Sibling)  Diabetes mellitus (Sibling)  Family history of CVA  No pertinent family history in first degree relatives      RECENT LABS/IMAGING  CBC Full  -  ( 2017 02:18 )  WBC Count : 3.69 K/uL  Hemoglobin : 7.8 g/dL  Hematocrit : 25.0 %  Platelet Count - Automated : 181 K/uL  Mean Cell Volume : 83.9 fL  Mean Cell Hemoglobin : 26.2 pg  Mean Cell Hemoglobin Concentration : 31.2 %  Auto Neutrophil # : 2.61 K/uL  Auto Lymphocyte # : 0.53 K/uL  Auto Monocyte # : 0.48 K/uL  Auto Eosinophil # : 0.04 K/uL  Auto Basophil # : 0.02 K/uL  Auto Neutrophil % : 70.7 %  Auto Lymphocyte % : 14.4 %  Auto Monocyte % : 13.0 %  Auto Eosinophil % : 1.1 %  Auto Basophil % : 0.5 %        138  |  95<L>  |  38<H>  ----------------------------<  84  5.0   |  23  |  7.06<H>    Ca    8.0<L>      2017 02:18  Phos  5.3       Mg     1.9           Urinalysis Basic - ( 2017 14:11 )    Color: YELLOW / Appearance: CLEAR / S.013 / pH: 8.0  Gluc: NEGATIVE / Ketone: NEGATIVE  / Bili: NEGATIVE / Urobili: NORMAL E.U.   Blood: MODERATE / Protein: 100 / Nitrite: NEGATIVE   Leuk Esterase: MODERATE / RBC: 25-50 / WBC 25-50   Sq Epi: OCC / Non Sq Epi: x / Bacteria: x        VITALS  T(C): 36.8 (17 @ 12:00), Max: 37.1 (17 @ 16:00)  HR: 60 (17 @ 13:00) (59 - 67)  BP: --  RR: 17 (17 @ 13:00) (14 - 22)  SpO2: 100% (17 @ 13:00) (95% - 100%)  Wt(kg): --    ALLERGIES  kiwi (Swelling)  No Known Drug Allergies      MEDICATIONS   aspirin  chewable 81 milliGRAM(s) Oral <User Schedule>  calcitriol   Capsule 0.25 MICROGram(s) Oral daily  heparin  Injectable 5000 Unit(s) SubCutaneous every 8 hours  ketorolac   Injectable 15 milliGRAM(s) IV Push once  labetalol 200 milliGRAM(s) Oral three times a day  niCARdipine Infusion 5 mG/Hr IV Continuous <Continuous>      ----------------------------------------------------------------------------------------  PHYSICAL EXAM  Constitutional - NAD, Comfortable  HEENT - NCAT, EOMI  Neck - Supple, No limited ROM  Chest - CTA bilaterally, No wheeze, No rhonchi, No crackles  Cardiovascular - RRR, S1S2, No murmurs  Abdomen - BS+, Soft, NTND  Extremities - No C/C/E, No calf tenderness   Neurologic Exam -                    Cognitive - Awake, Alert, AAO to self, place, date, year, situation     Communication - Fluent, No dysarthria, no aphasia     Cranial Nerves - CN 2-12 intact     Motor - No focal deficits                       Sensory - Intact to LT     Reflexes - DTR Intact, No primitive reflexive     Balance - WNL Static  Psychiatric - Mood stable, Affect WNL Patient is a 82 year old male with PMHx of Type B aortic dissection s/p repair  (at [place], Dr Ennis), ESRD on HD (last ), AICD, MVR , presents with severe back pain since Tuesday.  Denies having any pain like this ever before.  Pain is limited to right  upper back, worse with moving. Denies any radiating pain, any numbness, tingling or weakness. No dissection on CT.       PMH  Aortic dissection  Pacemaker/AICD  Renal Carcinoma  BPH (benign prostatic hypertrophy)  Osteoarthritis  ESRD  Intracranial Subdural Hematoma  HTN  PHT (Pulmonary Hypertension)  CHF (Congestive Heart Failure)    PSH  AVF superficialization (RUE basilic vein transposition)   AICD   R nephrectomy 2006  Cataract, bilateral  Aortic dissection repair   MVR   Craniotomy for SDH       MEDS  Labetalol 200 mg TID  ASA 81 mg  Calcitriol 0.25 mcg    Allergies  kiwi (Swelling)  No Known Drug Allergies (2017 18:04)      REVIEW OF SYSTEMS: No chest pain, shortness of breath, nausea, vomiting or diarhea.      PAST MEDICAL & SURGICAL HISTORY  Mitral valve disorder  AAA (abdominal aortic aneurysm)  BPH (benign prostatic hypertrophy)  Osteoarthritis  CKD (chronic kidney disease)  Aortic dissection  Pacemaker  Renal Carcinoma  Intracranial Subdural Hematoma  Essential Hypertension  PHT (Pulmonary Hypertension)  CHF (Congestive Heart Failure)  Aortic dissection distal to left subclavian  CRF (chronic renal failure)  Chronic kidney disease (CKD)  Chronic kidney disease (CKD)  History of cystoscopy  Cataract, bilateral  H/O aortic aneurysm repair  Pacemaker  Mitral valve replaced  S/P Craniotomy  History of Nephrectomy  S/P Craniotomy  S/P Nephrectomy      SOCIAL HISTORY  Smoking - Denied, EtOH - Denied, Drugs - Denied    FUNCTIONAL HISTORY:   Lives with family   Independent [facility]     CURRENT FUNCTIONAL STATUS:Supervision      FAMILY HISTORY   Family history of aortic dissection (Sibling)  Family history of stroke (Sibling)  Family history of heart failure (Sibling)  Hypertension (Sibling)  Diabetes mellitus (Sibling)  Family history of CVA  No pertinent family history in first degree relatives      RECENT LABS/IMAGING  CBC Full  -  ( 2017 02:18 )  WBC Count : 3.69 K/uL  Hemoglobin : 7.8 g/dL  Hematocrit : 25.0 %  Platelet Count - Automated : 181 K/uL  Mean Cell Volume : 83.9 fL  Mean Cell Hemoglobin : 26.2 pg  Mean Cell Hemoglobin Concentration : 31.2 %  Auto Neutrophil # : 2.61 K/uL  Auto Lymphocyte # : 0.53 K/uL  Auto Monocyte # : 0.48 K/uL  Auto Eosinophil # : 0.04 K/uL  Auto Basophil # : 0.02 K/uL  Auto Neutrophil % : 70.7 %  Auto Lymphocyte % : 14.4 %  Auto Monocyte % : 13.0 %  Auto Eosinophil % : 1.1 %  Auto Basophil % : 0.5 %        138  |  95<L>  |  38<H>  ----------------------------<  84  5.0   |  23  |  7.06<H>    Ca    8.0<L>      2017 02:18  Phos  5.3       Mg     1.9           Urinalysis Basic - ( 2017 14:11 )    Color: YELLOW / Appearance: CLEAR / S.013 / pH: 8.0  Gluc: NEGATIVE / Ketone: NEGATIVE  / Bili: NEGATIVE / Urobili: NORMAL E.U.   Blood: MODERATE / Protein: 100 / Nitrite: NEGATIVE   Leuk Esterase: MODERATE / RBC: 25-50 / WBC 25-50   Sq Epi: OCC / Non Sq Epi: x / Bacteria: x        VITALS  T(C): 36.8 (17 @ 12:00), Max: 37.1 (17 @ 16:00)  HR: 60 (17 @ 13:00) (59 - 67)  BP: --  RR: 17 (17 @ 13:00) (14 - 22)  SpO2: 100% (17 @ 13:00) (95% - 100%)  Wt(kg): --    ALLERGIES  kiwi (Swelling)  No Known Drug Allergies      MEDICATIONS   aspirin  chewable 81 milliGRAM(s) Oral <User Schedule>  calcitriol   Capsule 0.25 MICROGram(s) Oral daily  heparin  Injectable 5000 Unit(s) SubCutaneous every 8 hours  ketorolac   Injectable 15 milliGRAM(s) IV Push once  labetalol 200 milliGRAM(s) Oral three times a day  niCARdipine Infusion 5 mG/Hr IV Continuous <Continuous>      ----------------------------------------------------------------------------------------  PHYSICAL EXAM  Constitutional - NAD, Comfortable  HEENT - NCAT, EOMI  Neck - Supple, No limited ROM  Chest - CTA bilaterally, No wheeze, No rhonchi, No crackles  Cardiovascular - RRR, S1S2, No murmurs  Abdomen - BS+, Soft, NTND  Extremities - No C/C/E, No calf tenderness   Neurologic Exam -                    Cranial Nerves - CN 2-12 intact     Motor - No focal deficits                       Sensory - Intact to LT     Reflexes - DTR Intact, No primitive reflexive     Balance - WNL Static  Psychiatric - Mood stable, Affect WNL  MSk: NROM b/l shoulders, + TTP and mild swelling medially from spine of scapula.

## 2017-11-28 NOTE — CONSULT NOTE ADULT - PROBLEM SELECTOR RECOMMENDATION 9
Scapular pain appears myofascial  Would start  lidoderm patch to right scapula, Tylenol 975 mg TID, Celebrex 100mg BID ( ESRD on dialysis), if no improvement would consider imaging ( US vs CT unable to  get MRI)  Will follow

## 2017-11-28 NOTE — PROGRESS NOTE ADULT - ATTENDING COMMENTS
I examined this patient on AM rounds with the SICU team.  All relevant studies reviewed.  I personally spoke with Dr. Collazo (vascular) who knows this patient well.  Agree with the data recorded above.  I spent 35 minutes in assessment, discussion, and plan of care for the patient.  Issues:    Hypertension:  was malignant with possible extension of know aortic dissection (type B) upon arrival.  Placed on a Cardene drip and systolics now below 120 (target agreed on by Dr. Collazo.)  There is a question as to whether the CT scan demonstrates a progression of the dissection (previously repaired ~ 2010) or a threatened rupture.    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  See above.  Await plan from vascular.    End stage renal failure requiring chronic dialysis - s/p RRT last night (-700).  Mitral valvular disease - s/p bioprosthetic mitral valve replacement. with resultant heart block and AV sequential pacer. Additionally associated chronic systolic heart failure.   Patient was NOT anticoagulated secondary to "bleeding issues" with surgery in the past.      Elderly man with multiple significant co-morbidities.  Unclear whether his presentation is one of threatened abdominal aortic rupture versus progression of thoracoabdominal dissection.  Blood pressure under good control  Asymptomatic.  Continue SICU care.  Await vascular surgery evaluation and plan.
Patient seen and evaluated. Imaging reviewed. CT findings stable. No evidence of malperfusion on imaging/exam.  Poor candidate for open repair. Concerns for endovascular repair include propagation of dissection into mesenteric vessels, paralysis given extensive prior aortic replacement, inadequate seal and technical challenges of deploying graft in aorta given small distal true lumen and difficulty predicting appropriate graft opening in true lumen. At this point, following extensive discussion, we will continue to monitor the patient with serial imaging studies and manage medically, with appropriate blood pressure control.
I examined this patient on AM rounds with the SICU team.  All relevant studies reviewed including repeat contrast enhanced Chest/Abdomen CT to re-evaluate the aortic dissection and ? impending rupture.  I reviewed this study with Dr. Usama Lilly (cross sectional body imaging chief) who noted significant change from 2013 but no significant change from the recent previous study.  Issues:    Severe hypertension with systolic pressure ~ 180.  This has been controlled > 24 hours at this point.  We were able to wean the Cardene drip and go back to baseline medication regimen (oral Cardizem) this morning.  Thoracoabdominal dissection with aneurysmal dilation.  Present as described above but not changed in ~ 48 hours.  Management as per vascular surgery.  End stage renal failure with chronic dialysis need.  Anticipate dialysis tomorrow.  Metabolic parameters and volume ok.    Known mitral valve disease, s/p replacement and placement of PPM for compete heart block.  Stable.    Chest and back pain - likely musculo-skeletal is the consensus of consultants and vascular surgery.  Have consulted physiatry and neurology to assess.  An ECG done this AM showed no ischemic change.  Troponin's very mildly elevated - likely artifact given ESRD but will recheck.  Likely transfer to floor later today.

## 2017-11-28 NOTE — PROGRESS NOTE ADULT - SUBJECTIVE AND OBJECTIVE BOX
Surgery Progress Note    S: Patient seen and examined. No acute events overnight. Pain well controlled with current regimen. Patient went to CT scan overnight, prelim read reveals stable dissection flap, patient without back pain overnight. He remained on nicardipine gtt overnight and started on po labetalol, at 540 am patient complained of right sided chest pain, he says he usually has this dull pain on his left sided relieved by gas x.    O:  Vital Signs Last 24 Hrs  T(C): 36.7 (28 Nov 2017 04:00), Max: 37.1 (27 Nov 2017 16:00)  T(F): 98 (28 Nov 2017 04:00), Max: 98.7 (27 Nov 2017 16:00)  HR: 61 (28 Nov 2017 05:00) (59 - 67)  BP: 105/37 (27 Nov 2017 08:00) (105/37 - 105/37)  BP(mean): 54 (27 Nov 2017 08:00) (54 - 54)  RR: 19 (28 Nov 2017 05:00) (14 - 25)  SpO2: 96% (28 Nov 2017 05:00) (95% - 100%)    I&O's Detail    26 Nov 2017 07:01  -  27 Nov 2017 07:00  --------------------------------------------------------  IN:    Labetalol Infusion: 660 mL    niCARdipine Infusion: 377.5 mL    Other: 500 mL  Total IN: 1537.5 mL    OUT:    Other: 1200 mL  Total OUT: 1200 mL    Total NET: 337.5 mL      27 Nov 2017 07:01  -  28 Nov 2017 06:01  --------------------------------------------------------  IN:    niCARdipine Infusion: 125 mL    niCARdipine Infusion: 865 mL    Oral Fluid: 30 mL  Total IN: 1020 mL    OUT:  Total OUT: 0 mL    Total NET: 1020 mL          MEDICATIONS  (STANDING):  aspirin  chewable 81 milliGRAM(s) Oral <User Schedule>  calcitriol   Capsule 0.25 MICROGram(s) Oral daily  heparin  Injectable 5000 Unit(s) SubCutaneous every 8 hours  labetalol 200 milliGRAM(s) Oral <User Schedule>  niCARdipine Infusion 5 mG/Hr (25 mL/Hr) IV Continuous <Continuous>    MEDICATIONS  (PRN):                            7.8    3.69  )-----------( 181      ( 28 Nov 2017 02:18 )             25.0       11-28    138  |  95<L>  |  38<H>  ----------------------------<  84  5.0   |  23  |  7.06<H>    Ca    8.0<L>      28 Nov 2017 02:18  Phos  5.3     11-28  Mg     1.9     11-28    TPro  7.0  /  Alb  4.0  /  TBili  0.8  /  DBili  x   /  AST  9   /  ALT  4   /  AlkPhos  63  11-26      Physical Exam:  Gen: Laying in bed, NAD, alert and oriented.   Resp: Unlabored breathing  Abd: soft, NTND  Extremities: b/l palpable DP/PT

## 2017-11-28 NOTE — CONSULT NOTE ADULT - CONSULT REASON
Impending aortic aneurysm rupture.
ESRD on HD requiring emergent dialysis before surgery for worsening of AAA and abdominal aortic disection
rehab goals

## 2017-11-28 NOTE — PROGRESS NOTE ADULT - SUBJECTIVE AND OBJECTIVE BOX
Patient seen and examined  c/o left scapular pain  no chest pain or sob  off od cardene gtt    REVIEW OF SYSTEMS:  As per HPI, otherwise 8 full 10 ROS were unremarkable    MEDICATIONS  (STANDING):  aspirin  chewable 81 milliGRAM(s) Oral <User Schedule>  calcitriol   Capsule 0.25 MICROGram(s) Oral daily  heparin  Injectable 5000 Unit(s) SubCutaneous every 8 hours  ketorolac   Injectable 15 milliGRAM(s) IV Push once  labetalol 200 milliGRAM(s) Oral three times a day      VITAL:  T(C): , Max: 37.1 (11-27-17 @ 16:00)  T(F): , Max: 98.7 (11-27-17 @ 16:00)  HR: 60 (11-28-17 @ 13:00)  BP: --  BP(mean): --  RR: 17 (11-28-17 @ 13:00)  SpO2: 100% (11-28-17 @ 13:00)  Wt(kg): --    I and O's:    11-27 @ 07:01  -  11-28 @ 07:00  --------------------------------------------------------  IN: 1020 mL / OUT: 0 mL / NET: 1020 mL          PHYSICAL EXAM:    Constitutional: NAD  HEENT: PERRLA, EOMI,  MMM  Neck: No LAD, No JVD  Respiratory: CTAB  Cardiovascular: S1 and S2  Gastrointestinal: BS+, soft, NT/ND  Extremities: No peripheral edema    LABS:                        7.8    3.69  )-----------( 181      ( 28 Nov 2017 02:18 )             25.0     11-28    138  |  95<L>  |  38<H>  ----------------------------<  84  5.0   |  23  |  7.06<H>    Ca    8.0<L>      28 Nov 2017 02:18  Phos  5.3     11-28  Mg     1.9     11-28        Urine Studies:          ASSESSMENT: Patient is a 82 year old male with PMHx of Type B aortic dissection s/p repair 2013 (at [place], Dr Ennis), ESRD on HD (last Th), AICD, MVR 2010, presents with severe back pain since Tuesday found to have enlarging infrarenal AAA suspicious for impending rupture. Patient is planned for emergent surgery and will need emergent dialysis. Patient presented with very high blood pressure      1. ESRD on Home hemodialysis- Mon Tue Thurs Fri , through a right AVF on the upper arm.   2. Hypertensive emergency associated with impending AAA rupture and aortic dissection- off of cardene gtt- more controlled. art line pressure is ~ 20 mmHg than BP cuff reading  3. Anemia likely of chronic disease  4. Type B aortic dissection management per vascular surgery- stable- no surgery planned    RECOMMENDATIONS:  - HD today  - Epogen with HD  - keep on low K diet  - dose meds per GFR less than 15 ml/min    D/W SICU team    Kunal Bond MD  [facility]  [phone]

## 2017-11-29 ENCOUNTER — TRANSCRIPTION ENCOUNTER (OUTPATIENT)
Age: 82
End: 2017-11-29

## 2017-11-29 VITALS — WEIGHT: 120.37 LBS

## 2017-11-29 LAB
APTT BLD: 23.2 SEC — LOW (ref 27.5–37.4)
BUN SERPL-MCNC: 21 MG/DL — SIGNIFICANT CHANGE UP (ref 7–23)
CA-I BLD-SCNC: 1.06 MMOL/L — SIGNIFICANT CHANGE UP (ref 1.03–1.23)
CALCIUM SERPL-MCNC: 8.2 MG/DL — LOW (ref 8.4–10.5)
CHLORIDE SERPL-SCNC: 96 MMOL/L — LOW (ref 98–107)
CO2 SERPL-SCNC: 27 MMOL/L — SIGNIFICANT CHANGE UP (ref 22–31)
CREAT SERPL-MCNC: 4.36 MG/DL — HIGH (ref 0.5–1.3)
GLUCOSE SERPL-MCNC: 69 MG/DL — LOW (ref 70–99)
HCT VFR BLD CALC: 23.7 % — LOW (ref 39–50)
HGB BLD-MCNC: 7.9 G/DL — LOW (ref 13–17)
INR BLD: 1.04 — SIGNIFICANT CHANGE UP (ref 0.88–1.17)
MAGNESIUM SERPL-MCNC: 1.9 MG/DL — SIGNIFICANT CHANGE UP (ref 1.6–2.6)
MCHC RBC-ENTMCNC: 26.9 PG — LOW (ref 27–34)
MCHC RBC-ENTMCNC: 33.3 % — SIGNIFICANT CHANGE UP (ref 32–36)
MCV RBC AUTO: 80.6 FL — SIGNIFICANT CHANGE UP (ref 80–100)
PHOSPHATE SERPL-MCNC: 3 MG/DL — SIGNIFICANT CHANGE UP (ref 2.5–4.5)
PLATELET # BLD AUTO: 198 K/UL — SIGNIFICANT CHANGE UP (ref 150–400)
POTASSIUM SERPL-MCNC: 3.5 MMOL/L — SIGNIFICANT CHANGE UP (ref 3.5–5.3)
POTASSIUM SERPL-SCNC: 3.5 MMOL/L — SIGNIFICANT CHANGE UP (ref 3.5–5.3)
PROTHROM AB SERPL-ACNC: 11.7 SEC — SIGNIFICANT CHANGE UP (ref 9.8–13.1)
RBC # BLD: 2.94 M/UL — LOW (ref 4.2–5.8)
RBC # FLD: 16.8 % — HIGH (ref 10.3–14.5)
SODIUM SERPL-SCNC: 138 MMOL/L — SIGNIFICANT CHANGE UP (ref 135–145)
WBC # BLD: 3.74 K/UL — LOW (ref 3.8–10.5)
WBC # FLD AUTO: 3.74 K/UL — LOW (ref 3.8–10.5)

## 2017-11-29 RX ORDER — LIDOCAINE 4 G/100G
1 CREAM TOPICAL
Qty: 5 | Refills: 0 | OUTPATIENT
Start: 2017-11-29

## 2017-11-29 RX ORDER — ACETAMINOPHEN 500 MG
3 TABLET ORAL
Qty: 63 | Refills: 0 | OUTPATIENT
Start: 2017-11-29 | End: 2017-12-06

## 2017-11-29 RX ORDER — CELECOXIB 200 MG/1
100 CAPSULE ORAL
Qty: 0 | Refills: 0 | Status: DISCONTINUED | OUTPATIENT
Start: 2017-11-29 | End: 2017-11-29

## 2017-11-29 RX ORDER — ACETAMINOPHEN 500 MG
975 TABLET ORAL THREE TIMES A DAY
Qty: 0 | Refills: 0 | Status: DISCONTINUED | OUTPATIENT
Start: 2017-11-29 | End: 2017-11-29

## 2017-11-29 RX ORDER — CELECOXIB 200 MG/1
1 CAPSULE ORAL
Qty: 14 | Refills: 0 | OUTPATIENT
Start: 2017-11-29

## 2017-11-29 RX ADMIN — Medication 200 MILLIGRAM(S): at 10:59

## 2017-11-29 RX ADMIN — Medication 200 MILLIGRAM(S): at 02:20

## 2017-11-29 RX ADMIN — LIDOCAINE 1 PATCH: 4 CREAM TOPICAL at 10:59

## 2017-11-29 RX ADMIN — HEPARIN SODIUM 5000 UNIT(S): 5000 INJECTION INTRAVENOUS; SUBCUTANEOUS at 05:14

## 2017-11-29 RX ADMIN — Medication 81 MILLIGRAM(S): at 10:59

## 2017-11-29 RX ADMIN — CALCITRIOL 0.25 MICROGRAM(S): 0.5 CAPSULE ORAL at 11:00

## 2017-11-29 NOTE — PROVIDER CONTACT NOTE (OTHER) - ASSESSMENT
Pt A&Ox4, pt just received from SICU. Pt denies chest pain, SOB, and back pain. Pt received labetalol at around 0220AM and received a session of HD.

## 2017-11-29 NOTE — DISCHARGE NOTE ADULT - HOSPITAL COURSE
Pt is a 82 year old male with PMHx of Type B aortic dissection s/p repair 2013 (at [place], Dr Ennis), ESRD on HD (last Th), AICD, MVR 2010, presents with severe back pain since Tuesday. The patient reports that he only followed up with Dr Ennis twice since the surgery and was told he could follow-up with Dr Oliveira (CT surgery) and Dr. Robles (cards). He reports last follow-up >6 months ago, and last imaging may date back to 2013. Denies having any pain like this ever before. Denies abdominal pain, nausea or vomiting. +Flatus, BM, non-bloody, no diarrhea. Pain is limited to upper back, worse with moving. Denies any LE pain, is ambulatory with assistance.   Pt underwent a CT scan of chest, abdomen and pelvis which showed 1. Status post repair of a type B aortic dissection with persistent  abdominal aortic dissection as described. 2. Partially thrombosed abdominal aortic aneurysm at the level of the bifurcation. Thrombosed focal outpouching. 3. Aneurysmal dilatation of the abdominal aorta at the level of the hiatus,unchanged.  Pt was hypertensive with BP in 200's. Pt was started on a Cardene gtt and monitored in the SICU. Pts BP was controlled and he was started on oral labetalol. Pt remained stable and was transferred to the med-surg floor. It was determined that no surgical intervention was indicated at this time. Pt has been deemed stable for discharge at this time.

## 2017-11-29 NOTE — PROGRESS NOTE ADULT - SUBJECTIVE AND OBJECTIVE BOX
Surgery Transfer / Progress Note    S: Patient seen and examined. No acute events overnight. Patient was transferred from SICU to floor around 4am this morning. Pain well controlled with current regimen. His blood pressures remain well controlled. Denies nausea/vomiting.     O:  Vital Signs Last 24 Hrs  T(C): 36.4 (29 Nov 2017 03:24), Max: 36.8 (28 Nov 2017 12:00)  T(F): 97.5 (29 Nov 2017 03:24), Max: 98.2 (28 Nov 2017 12:00)  HR: 64 (29 Nov 2017 04:10) (60 - 66)  BP: 137/63 (29 Nov 2017 04:10) (106/45 - 154/68)  BP(mean): 68 (29 Nov 2017 02:15) (60 - 92)  RR: 18 (29 Nov 2017 03:24) (15 - 22)  SpO2: 100% (29 Nov 2017 03:24) (96% - 100%)    I&O's Detail    27 Nov 2017 07:01  -  28 Nov 2017 07:00  --------------------------------------------------------  IN:    niCARdipine Infusion: 125 mL    niCARdipine Infusion: 865 mL    Oral Fluid: 30 mL  Total IN: 1020 mL    OUT:  Total OUT: 0 mL    Total NET: 1020 mL      28 Nov 2017 07:01  -  29 Nov 2017 04:45  --------------------------------------------------------  IN:    Other: 600 mL  Total IN: 600 mL    OUT:    Other: 1300 mL    Voided: 150 mL  Total OUT: 1450 mL    Total NET: -850 mL          MEDICATIONS  (STANDING):  aspirin  chewable 81 milliGRAM(s) Oral <User Schedule>  calcitriol   Capsule 0.25 MICROGram(s) Oral daily  heparin  Injectable 5000 Unit(s) SubCutaneous every 8 hours  labetalol 200 milliGRAM(s) Oral three times a day    MEDICATIONS  (PRN):  acetaminophen   Tablet. 650 milliGRAM(s) Oral every 6 hours PRN Mild Pain (1 - 3)  lidocaine   Patch 1 Patch Transdermal daily PRN Back pain                            7.8    3.69  )-----------( 181      ( 28 Nov 2017 02:18 )             25.0       11-29    138  |  96<L>  |  21  ----------------------------<  69<L>  3.5   |  27  |  4.36<H>    Ca    8.2<L>      29 Nov 2017 02:30  Phos  3.0     11-29  Mg     1.9     11-29        Physical Exam:  Gen: Laying in bed, NAD, alert and oriented.   Resp: Unlabored breathing  Abd: soft, NTND  Extremities: b/l palpable DP/PT

## 2017-11-29 NOTE — DISCHARGE NOTE ADULT - CARE PLAN
Principal Discharge DX:	Abdominal aortic aneurysm (AAA) without rupture  Goal:	pain control  Instructions for follow-up, activity and diet:	ACTIVITY: No heavy lifting or straining. Otherwise, you may return to your usual level of physical activity. If you are taking narcotic pain medication DO NOT drive a car, operate machinery or make important decisions.  DIET: Return to your usual diet.  NOTIFY YOUR SURGEON IF: You have any bleeding that does not stop, any pus draining from your wound(s), increased pain at surgical site, any fever (over 100.4 F) persistent nausea/vomiting, or if your pain is not controlled on your discharge pain medications.  Please follow up with your primary care physician in 1-2 weeks regarding your hospitalization.  Please follow up with your surgeon Dr. Palumbo in 1-2 weeks. Please call  [phone] to make an appointment Principal Discharge DX:	Abdominal aortic aneurysm (AAA) without rupture  Goal:	pain control  Instructions for follow-up, activity and diet:	ACTIVITY: No heavy lifting or straining. Otherwise, you may return to your usual level of physical activity. If you are taking narcotic pain medication DO NOT drive a car, operate machinery or make important decisions.  DIET: Return to your usual diet.  NOTIFY YOUR SURGEON IF: You have any bleeding that does not stop, any pus draining from your wound(s), increased pain at surgical site, any fever (over 100.4 F) persistent nausea/vomiting, or if your pain is not controlled on your discharge pain medications.  Please follow up with your primary care physician in 1-2 weeks regarding your hospitalization.  Please follow up with your surgeon Dr. Collazo in 1-2 weeks. Please [phone] to make an appointment

## 2017-11-29 NOTE — PROVIDER CONTACT NOTE (OTHER) - ACTION/TREATMENT ORDERED:
Provider aware and states to recheck BP in 15 minutes. Will wait, reassess, and continue to monitor patient. Provider aware and states to recheck BP in 15 minutes. Provider also states patient is cleared for 3N and safe to be here without tele monitor. Will wait, reassess, and continue to monitor patient.

## 2017-11-29 NOTE — PROVIDER CONTACT NOTE (OTHER) - BACKGROUND
Pt admitted with AAA suspicious for rupture. Pt with hx of type b AAA repair in 2013, AICD placement, ESRD, BPH, HTN, CHF, and MV repair.

## 2017-11-29 NOTE — PROGRESS NOTE ADULT - ASSESSMENT
82M with extensive cardiac history, Type B dissection s/p repair in 2013, now with severe back pain, found to have enlarging infrarenal AAA suspicious for impending rupture.     - Goal SBP < 120 mmHg  - Monitor vitals   - D/w CT surgery to discuss Type B dissection repair  - Cards evaluation for preop risk stratification and AICD eval  - EVAR planning  - Seen by PM&R yesterday, appreciate yimi Soliz, PGY-1  C Team Surgery [id number] 82M with extensive cardiac history, Type B dissection s/p repair in 2013, now with severe back pain, found to have an infrarenal AAA, but likely myofascial     - Goal SBP < 120 mmHg  - Monitor vitals   - f/u PM&R recs on pain management   - dispo planning     Clarke Soliz, PGY-1  C Team Surgery [id number]

## 2017-11-29 NOTE — PROGRESS NOTE ADULT - SUBJECTIVE AND OBJECTIVE BOX
Clinically stable  Complains of sporadic mid scapular pain and tenderness  Likely muscloskeletal  Repeat CT unchanged  Plan as per physiatry  DC Planning

## 2017-11-29 NOTE — DISCHARGE NOTE ADULT - PATIENT PORTAL LINK FT
“You can access the FollowHealth Patient Portal, offered by [facility], by registering with the following website: [email or web address]”

## 2017-11-29 NOTE — DISCHARGE NOTE ADULT - OTHER SIGNIFICANT FINDINGS
CT angio chest, abdomen and pelvis (11/27): 1. Status post repair of a type B aortic dissection with persistent  abdominal aortic dissection as described. 2. Partially thrombosed abdominal aortic aneurysm at the level of the  bifurcation. Thrombosed focal outpouching. 3. Aneurysmal dilatation of the abdominal aorta at the level of the hiatus,  unchanged.

## 2017-11-29 NOTE — DISCHARGE NOTE ADULT - ADDITIONAL INSTRUCTIONS
Please check your BP two times per day. Goal BP <120 mmHg. Please contact your primary care physician if blood pressure elevated.

## 2017-11-29 NOTE — DISCHARGE NOTE ADULT - CARE PROVIDERS DIRECT ADDRESSES
,[email or web address]

## 2017-11-29 NOTE — DISCHARGE NOTE ADULT - CARE PROVIDER_API CALL
Lakesha Palumbo), Surgery  [address]  [address]  [place] [address]  Phone: [phone]  Fax: [phone] Lakesha Palumbo), Surgery  [address]  [address]  [place] [address]  Phone: [phone]  Fax: [phone]    Sacha Collazo), Surgery; Vascular Surgery  [address] [place] [address]  Phone: [phone]  Fax: [phone]

## 2017-11-29 NOTE — DISCHARGE NOTE ADULT - MEDICATION SUMMARY - MEDICATIONS TO TAKE
I will START or STAY ON the medications listed below when I get home from the hospital:    aspirin 81 mg oral tablet  -- 1 tab(s) by mouth once a day  -- Indication: For CAD    celecoxib 100 mg oral capsule  -- 1 cap(s) by mouth 2 times a day (with meals)  -- Indication: For pain    acetaminophen 325 mg oral tablet  -- 3 tab(s) by mouth 3 times a day  -- Indication: For Pain    labetalol 200 mg oral tablet  -- 1 tab(s) by mouth 3 times a day  -- Indication: For Aortic aneurysm    lidocaine 5% topical film  -- Please apply patch to back once daily for up to 12 hours in a 24 hour period.  -- Indication: For pain     calcitriol 0.25 mcg oral capsule  -- 1 cap(s) by mouth once a day  -- Indication: For vitamin

## 2017-11-29 NOTE — DISCHARGE NOTE ADULT - PLAN OF CARE
pain control ACTIVITY: No heavy lifting or straining. Otherwise, you may return to your usual level of physical activity. If you are taking narcotic pain medication DO NOT drive a car, operate machinery or make important decisions.  DIET: Return to your usual diet.  NOTIFY YOUR SURGEON IF: You have any bleeding that does not stop, any pus draining from your wound(s), increased pain at surgical site, any fever (over 100.4 F) persistent nausea/vomiting, or if your pain is not controlled on your discharge pain medications.  Please follow up with your primary care physician in 1-2 weeks regarding your hospitalization.  Please follow up with your surgeon Dr. Palumbo in 1-2 weeks. Please call  [phone] to make an appointment ACTIVITY: No heavy lifting or straining. Otherwise, you may return to your usual level of physical activity. If you are taking narcotic pain medication DO NOT drive a car, operate machinery or make important decisions.  DIET: Return to your usual diet.  NOTIFY YOUR SURGEON IF: You have any bleeding that does not stop, any pus draining from your wound(s), increased pain at surgical site, any fever (over 100.4 F) persistent nausea/vomiting, or if your pain is not controlled on your discharge pain medications.  Please follow up with your primary care physician in 1-2 weeks regarding your hospitalization.  Please follow up with your surgeon Dr. Collazo in 1-2 weeks. Please [phone] to make an appointment

## 2017-12-07 ENCOUNTER — APPOINTMENT (OUTPATIENT)
Dept: CARDIOLOGY | Facility: CLINIC | Age: 82
End: 2017-12-07
Payer: MEDICARE

## 2017-12-07 ENCOUNTER — NON-APPOINTMENT (OUTPATIENT)
Age: 82
End: 2017-12-07

## 2017-12-07 VITALS
SYSTOLIC BLOOD PRESSURE: 128 MMHG | WEIGHT: 120 LBS | HEIGHT: 65 IN | OXYGEN SATURATION: 99 % | RESPIRATION RATE: 16 BRPM | BODY MASS INDEX: 19.99 KG/M2 | DIASTOLIC BLOOD PRESSURE: 66 MMHG | HEART RATE: 60 BPM

## 2017-12-07 PROCEDURE — 99215 OFFICE O/P EST HI 40 MIN: CPT

## 2017-12-07 PROCEDURE — 93000 ELECTROCARDIOGRAM COMPLETE: CPT

## 2017-12-14 ENCOUNTER — APPOINTMENT (OUTPATIENT)
Dept: CARDIOLOGY | Facility: CLINIC | Age: 82
End: 2017-12-14
Payer: MEDICARE

## 2017-12-14 ENCOUNTER — NON-APPOINTMENT (OUTPATIENT)
Age: 82
End: 2017-12-14

## 2017-12-14 VITALS
BODY MASS INDEX: 19.99 KG/M2 | WEIGHT: 120 LBS | DIASTOLIC BLOOD PRESSURE: 54 MMHG | HEIGHT: 65 IN | HEART RATE: 60 BPM | OXYGEN SATURATION: 99 % | RESPIRATION RATE: 16 BRPM | SYSTOLIC BLOOD PRESSURE: 133 MMHG

## 2017-12-14 DIAGNOSIS — I71.2 THORACIC AORTIC ANEURYSM, W/OUT RUPTURE: ICD-10-CM

## 2017-12-14 DIAGNOSIS — I71.00 DISSECTION OF UNSPECIFIED SITE OF AORTA: ICD-10-CM

## 2017-12-14 PROCEDURE — 93000 ELECTROCARDIOGRAM COMPLETE: CPT

## 2017-12-14 PROCEDURE — 99215 OFFICE O/P EST HI 40 MIN: CPT

## 2017-12-26 ENCOUNTER — FORM ENCOUNTER (OUTPATIENT)
Age: 82
End: 2017-12-26

## 2017-12-27 ENCOUNTER — APPOINTMENT (OUTPATIENT)
Age: 82
End: 2017-12-27
Payer: MEDICARE

## 2017-12-27 PROCEDURE — 36902Z: CUSTOM

## 2017-12-27 PROCEDURE — 36907Z: CUSTOM | Mod: 59

## 2017-12-29 ENCOUNTER — APPOINTMENT (OUTPATIENT)
Dept: VASCULAR SURGERY | Facility: CLINIC | Age: 82
End: 2017-12-29
Payer: MEDICARE

## 2017-12-29 VITALS
SYSTOLIC BLOOD PRESSURE: 137 MMHG | DIASTOLIC BLOOD PRESSURE: 71 MMHG | HEIGHT: 65 IN | TEMPERATURE: 97.6 F | WEIGHT: 120 LBS | BODY MASS INDEX: 19.99 KG/M2 | HEART RATE: 60 BPM

## 2017-12-29 PROCEDURE — 99214 OFFICE O/P EST MOD 30 MIN: CPT

## 2018-01-12 ENCOUNTER — INPATIENT (INPATIENT)
Facility: HOSPITAL | Age: 83
LOS: 3 days | Discharge: HOME | End: 2018-01-16
Attending: INTERNAL MEDICINE | Admitting: INTERNAL MEDICINE
Payer: MEDICARE

## 2018-01-12 VITALS
HEART RATE: 117 BPM | RESPIRATION RATE: 18 BRPM | OXYGEN SATURATION: 95 % | TEMPERATURE: 99 F | DIASTOLIC BLOOD PRESSURE: 37 MMHG | SYSTOLIC BLOOD PRESSURE: 112 MMHG

## 2018-01-12 DIAGNOSIS — D64.9 ANEMIA, UNSPECIFIED: ICD-10-CM

## 2018-01-12 DIAGNOSIS — I50.33 ACUTE ON CHRONIC DIASTOLIC (CONGESTIVE) HEART FAILURE: ICD-10-CM

## 2018-01-12 DIAGNOSIS — N18.6 END STAGE RENAL DISEASE: ICD-10-CM

## 2018-01-12 DIAGNOSIS — N18.9 CHRONIC KIDNEY DISEASE, UNSPECIFIED: Chronic | ICD-10-CM

## 2018-01-12 DIAGNOSIS — I10 ESSENTIAL (PRIMARY) HYPERTENSION: ICD-10-CM

## 2018-01-12 DIAGNOSIS — H26.9 UNSPECIFIED CATARACT: Chronic | ICD-10-CM

## 2018-01-12 DIAGNOSIS — I27.20 PULMONARY HYPERTENSION, UNSPECIFIED: ICD-10-CM

## 2018-01-12 DIAGNOSIS — Z98.89 OTHER SPECIFIED POSTPROCEDURAL STATES: Chronic | ICD-10-CM

## 2018-01-12 DIAGNOSIS — I71.01 DISSECTION OF THORACIC AORTA: Chronic | ICD-10-CM

## 2018-01-12 LAB
ALBUMIN SERPL ELPH-MCNC: 3.5 G/DL — SIGNIFICANT CHANGE UP (ref 3.3–5)
ALP SERPL-CCNC: 71 U/L — SIGNIFICANT CHANGE UP (ref 40–120)
ALT FLD-CCNC: 11 U/L — SIGNIFICANT CHANGE UP (ref 4–41)
ANTIBODY ID 1_1: SIGNIFICANT CHANGE UP
APTT BLD: 27.7 SEC — SIGNIFICANT CHANGE UP (ref 27.5–37.4)
AST SERPL-CCNC: 30 U/L — SIGNIFICANT CHANGE UP (ref 4–40)
BASE EXCESS BLDV CALC-SCNC: 9.6 MMOL/L — SIGNIFICANT CHANGE UP
BASOPHILS # BLD AUTO: 0.01 K/UL — SIGNIFICANT CHANGE UP (ref 0–0.2)
BASOPHILS NFR BLD AUTO: 0.1 % — SIGNIFICANT CHANGE UP (ref 0–2)
BILIRUB SERPL-MCNC: 0.8 MG/DL — SIGNIFICANT CHANGE UP (ref 0.2–1.2)
BLD GP AB SCN SERPL QL: POSITIVE — SIGNIFICANT CHANGE UP
BLD GP AB SCN SERPL QL: POSITIVE — SIGNIFICANT CHANGE UP
BLOOD GAS VENOUS - CREATININE: 6.79 MG/DL — HIGH (ref 0.5–1.3)
BUN SERPL-MCNC: 39 MG/DL — HIGH (ref 7–23)
CALCIUM SERPL-MCNC: 8.6 MG/DL — SIGNIFICANT CHANGE UP (ref 8.4–10.5)
CHLORIDE BLDV-SCNC: 95 MMOL/L — LOW (ref 96–108)
CHLORIDE SERPL-SCNC: 95 MMOL/L — LOW (ref 98–107)
CO2 SERPL-SCNC: 31 MMOL/L — SIGNIFICANT CHANGE UP (ref 22–31)
CREAT SERPL-MCNC: 6.26 MG/DL — HIGH (ref 0.5–1.3)
DAT POLY-SP REAG RBC QL: NEGATIVE — SIGNIFICANT CHANGE UP
EOSINOPHIL # BLD AUTO: 0 K/UL — SIGNIFICANT CHANGE UP (ref 0–0.5)
EOSINOPHIL NFR BLD AUTO: 0 % — SIGNIFICANT CHANGE UP (ref 0–6)
FERRITIN SERPL-MCNC: 650 NG/ML — HIGH (ref 30–400)
GAS PNL BLDV: 134 MMOL/L — LOW (ref 136–146)
GLUCOSE BLDV-MCNC: 105 — HIGH (ref 70–99)
GLUCOSE SERPL-MCNC: 97 MG/DL — SIGNIFICANT CHANGE UP (ref 70–99)
HCO3 BLDV-SCNC: 32 MMOL/L — HIGH (ref 20–27)
HCT VFR BLD CALC: 23.5 % — LOW (ref 39–50)
HCT VFR BLDV CALC: 23.1 % — LOW (ref 39–51)
HGB BLD-MCNC: 7.2 G/DL — LOW (ref 13–17)
HGB BLDV-MCNC: 7.4 G/DL — LOW (ref 13–17)
IMM GRANULOCYTES # BLD AUTO: 0.07 # — SIGNIFICANT CHANGE UP
IMM GRANULOCYTES NFR BLD AUTO: 0.7 % — SIGNIFICANT CHANGE UP (ref 0–1.5)
INR BLD: 1.32 — HIGH (ref 0.88–1.17)
IRON SATN MFR SERPL: 198 UG/DL — SIGNIFICANT CHANGE UP (ref 155–535)
IRON SATN MFR SERPL: 23 UG/DL — LOW (ref 45–165)
LACTATE BLDV-MCNC: 2 MMOL/L — SIGNIFICANT CHANGE UP (ref 0.5–2)
LYMPHOCYTES # BLD AUTO: 0.55 K/UL — LOW (ref 1–3.3)
LYMPHOCYTES # BLD AUTO: 5.6 % — LOW (ref 13–44)
MCHC RBC-ENTMCNC: 26.4 PG — LOW (ref 27–34)
MCHC RBC-ENTMCNC: 30.6 % — LOW (ref 32–36)
MCV RBC AUTO: 86.1 FL — SIGNIFICANT CHANGE UP (ref 80–100)
MONOCYTES # BLD AUTO: 0.94 K/UL — HIGH (ref 0–0.9)
MONOCYTES NFR BLD AUTO: 9.6 % — SIGNIFICANT CHANGE UP (ref 2–14)
NEUTROPHILS # BLD AUTO: 8.26 K/UL — HIGH (ref 1.8–7.4)
NEUTROPHILS NFR BLD AUTO: 84 % — HIGH (ref 43–77)
NRBC # FLD: 0 — SIGNIFICANT CHANGE UP
NT-PROBNP SERPL-SCNC: SIGNIFICANT CHANGE UP PG/ML
PCO2 BLDV: 50 MMHG — SIGNIFICANT CHANGE UP (ref 41–51)
PH BLDV: 7.45 PH — HIGH (ref 7.32–7.43)
PLATELET # BLD AUTO: 157 K/UL — SIGNIFICANT CHANGE UP (ref 150–400)
PMV BLD: 11.2 FL — SIGNIFICANT CHANGE UP (ref 7–13)
PO2 BLDV: 28 MMHG — LOW (ref 35–40)
POTASSIUM BLDV-SCNC: 5.7 MMOL/L — HIGH (ref 3.4–4.5)
POTASSIUM SERPL-MCNC: 5.5 MMOL/L — HIGH (ref 3.5–5.3)
POTASSIUM SERPL-SCNC: 5.5 MMOL/L — HIGH (ref 3.5–5.3)
PROT SERPL-MCNC: 6.5 G/DL — SIGNIFICANT CHANGE UP (ref 6–8.3)
PROTHROM AB SERPL-ACNC: 14.7 SEC — HIGH (ref 9.8–13.1)
RBC # BLD: 2.73 M/UL — LOW (ref 4.2–5.8)
RBC # FLD: 16.1 % — HIGH (ref 10.3–14.5)
RH IG SCN BLD-IMP: NEGATIVE — SIGNIFICANT CHANGE UP
RH IG SCN BLD-IMP: NEGATIVE — SIGNIFICANT CHANGE UP
SAO2 % BLDV: 43.7 % — LOW (ref 60–85)
SODIUM SERPL-SCNC: 139 MMOL/L — SIGNIFICANT CHANGE UP (ref 135–145)
TSH SERPL-MCNC: 8.57 UIU/ML — HIGH (ref 0.27–4.2)
UIBC SERPL-MCNC: 175 UG/DL — SIGNIFICANT CHANGE UP (ref 110–370)
WBC # BLD: 9.83 K/UL — SIGNIFICANT CHANGE UP (ref 3.8–10.5)
WBC # FLD AUTO: 9.83 K/UL — SIGNIFICANT CHANGE UP (ref 3.8–10.5)

## 2018-01-12 PROCEDURE — 71045 X-RAY EXAM CHEST 1 VIEW: CPT | Mod: 26

## 2018-01-12 PROCEDURE — 99223 1ST HOSP IP/OBS HIGH 75: CPT | Mod: GC

## 2018-01-12 PROCEDURE — 86077 PHYS BLOOD BANK SERV XMATCH: CPT

## 2018-01-12 RX ORDER — ASPIRIN/CALCIUM CARB/MAGNESIUM 324 MG
1 TABLET ORAL
Qty: 0 | Refills: 0 | COMMUNITY

## 2018-01-12 RX ORDER — LABETALOL HCL 100 MG
1 TABLET ORAL
Qty: 0 | Refills: 0 | COMMUNITY

## 2018-01-12 RX ORDER — LABETALOL HCL 100 MG
200 TABLET ORAL THREE TIMES A DAY
Qty: 0 | Refills: 0 | Status: DISCONTINUED | OUTPATIENT
Start: 2018-01-12 | End: 2018-01-16

## 2018-01-12 RX ORDER — CALCITRIOL 0.5 UG/1
1 CAPSULE ORAL
Qty: 0 | Refills: 0 | COMMUNITY

## 2018-01-12 RX ORDER — CALCITRIOL 0.5 UG/1
0.25 CAPSULE ORAL DAILY
Qty: 0 | Refills: 0 | Status: DISCONTINUED | OUTPATIENT
Start: 2018-01-12 | End: 2018-01-16

## 2018-01-12 RX ORDER — ASPIRIN/CALCIUM CARB/MAGNESIUM 324 MG
81 TABLET ORAL DAILY
Qty: 0 | Refills: 0 | Status: DISCONTINUED | OUTPATIENT
Start: 2018-01-12 | End: 2018-01-16

## 2018-01-12 RX ADMIN — Medication 200 MILLIGRAM(S): at 23:43

## 2018-01-12 NOTE — H&P ADULT - PROBLEM SELECTOR PLAN 5
c/w labetalol    PPX - PAS, encourage PO intake, PT  d/w wife bedside  d/w Dr. Todd - to assume care of patient.

## 2018-01-12 NOTE — ED PROVIDER NOTE - OBJECTIVE STATEMENT
82 year old male with PMHx of Type B aortic dissection s/p repair 2013 (at [place], Dr Ennis), ESRD on HD (last Th), AICD, MVR 2010, here for possible anemia. pt reports he had outpatient labs done and hgb was 7.8 and was advised to come to the ER for repeat labs, admission and transfusion. pt is home dialysis patient of Dr. Emerson Romano who referred him to [facility] for admission. Associated with the anemia pt reports feeling tired and weak, occasional SOB, and poor appetite. Last dialyzed yesterday. I spoke with Dr. Romano who reports that patient has been having SOB, anemia, and possible CHF, reports that home dialysis is not effective for this patient at taking off fluid and would like pt admitted for labs, transfusion, and dialysis. Denies fever chills vomiting diarrhea cp HA dizziness.

## 2018-01-12 NOTE — ED PROVIDER NOTE - ATTENDING CONTRIBUTION TO CARE
ED Attending Dr. Oliver: 81 yo male with PM[facility], ESRD on HD TTS (last HD yesterday), AICD, MVR, anemia in past, in ED for possible anemia.  Pt states that he feels tired and weak with PABLO.  Had outpatient labs done showing Hgb 7.8 and pt was sent to ED for further workup and admission.  No fever, N/V/D or bloody stool.  On exam pt chronically-ill appearing but in NAD, heart RRR, lungs CTAB, abd NTND, extremities without swelling, strength 5/5 in all extremities and skin without rash.

## 2018-01-12 NOTE — H&P ADULT - NSHPPHYSICALEXAM_GEN_ALL_CORE
PHYSICAL EXAM:  Vital Signs Vital Signs Last 24 Hrs  T(C): 36.8 (12 Jan 2018 17:08), Max: 37.2 (12 Jan 2018 14:10)  T(F): 98.3 (12 Jan 2018 17:08), Max: 99 (12 Jan 2018 14:10)  HR: 57 (12 Jan 2018 17:08) (57 - 117)  BP: 131/54 (12 Jan 2018 17:08) (112/37 - 134/50)  BP(mean): --  RR: 18 (12 Jan 2018 17:08) (18 - 18)  SpO2: 95% (12 Jan 2018 17:08) (95% - 98%)  CAPILLARY BLOOD GLUCOSE        GENERAL: NAD, well-developed  HEAD:  Atraumatic, Normocephalic  Eyes: EOMI, PERRLA, conjunctiva and sclera clear  NECK: Supple, Positive hepatojugular reflux.    CHEST/LUNG: Clear to auscultation bilaterally; No wheezes  HEART: Regular rate and rhythm; Normal S1 and S2. Grade II/VI holosystolic apical murmur heard throughout precordium. + AICD  ABDOMEN: Bowel sounds present. Soft, Nontender, Nondistended.  No guarding, rigidity or rebound tenderness.   EXTREMITIES:  2+ Peripheral Pulses, No clubbing, cyanosis, or edema. AVF with audible bruit and palpable thrill  PSYCH: AAOx3  NEUROLOGY: non-focal  SKIN: No rashes or lesions

## 2018-01-12 NOTE — H&P ADULT - PSH
Aortic dissection distal to left subclavian  s/p repair 2013  Cataract, bilateral  right 3/2015, right  5/2015  Chronic kidney disease (CKD)  pt s/p Right AVF, s/p Right First stage basilic vein transposition  Chronic kidney disease (CKD)  s/p Right AVF 2/16, 4/16  CRF (chronic renal failure)  AV fistula right upper arm inserted in 2016 May  History of cystoscopy  cryoablation  of prostate 2014  Mitral valve replaced  Bioprosthetic - Dr. Roy 2010  Pacemaker  2010 [employer] model #2210, with upgrade to AICD in 2015 [employer] model #WA7338  S/P Craniotomy  1991  S/P Nephrectomy  right partial nephrectomy 2006

## 2018-01-12 NOTE — CHART NOTE - NSCHARTNOTEFT_GEN_A_CORE
Pt was seen and examined.  Briefly this is a elderly male c hx of HTN DM CAD Type B dissection pw CHF and anemia    Suggest  -Urgent HD and blood xfusion as well  -No GI claudio unless obvious signs of bleeding due to age/comorbidities  -Early physical therapy  -Please place on REGULAR DIET(NOT RENAL)  -Epogen to start Monday      Sayed Rosalina  [facility]  [phone]

## 2018-01-12 NOTE — ED PROVIDER NOTE - PSH
Aortic dissection distal to left subclavian  s/p repair 2013  Cataract, bilateral  right 3/2015, right  5/2015  Chronic kidney disease (CKD)  pt s/p Right AVF, s/p Right First stage basilic vein transposition  Chronic kidney disease (CKD)  s/p Right AVF 2/16, 4/16  CRF (chronic renal failure)  AV fistula right upper arm inserted in 2016 May  History of cystoscopy  cryoablation  of prostate 2014  Mitral valve replaced  Bioprosthetic - Dr. Roy 2010  Pacemaker  2010 [employer] model #2210, with upgrade to AICD in 2015 [employer] model #[id number]  S/P Craniotomy  1991  S/P Nephrectomy  right partial nephrectomy 2006

## 2018-01-12 NOTE — ED PROVIDER NOTE - CHPI ED SYMPTOMS NEG
no chills/no numbness/no fever/no decreased eating/drinking/no tingling/no nausea/no vomiting/no dizziness

## 2018-01-12 NOTE — H&P ADULT - ASSESSMENT
Patient is an 82yoM with PMH of Type B aortic dissection s/p repair in 2013 at [place], ESRD on HD at home M,T,Th,F, pulmonary hypertension, h/o AICD, h/o MVR in 2010 sent to ED after he was found to have a hemoglobin of 7.8

## 2018-01-12 NOTE — H&P ADULT - NSHPREVIEWOFSYSTEMS_GEN_ALL_CORE
CONSTITUTIONAL: No fever, weight loss, or fatigue  EYES: No eye pain, visual disturbances, or discharge  ENMT:  No difficulty hearing, tinnitus, vertigo; No sinus or throat pain  NECK: No pain or stiffness  RESPIRATORY: See HPI  CARDIOVASCULAR: See HPI  GASTROINTESTINAL: No abdominal or epigastric pain. No nausea, vomiting, or hematemesis; No diarrhea or constipation. No melena or hematochezia.  NEUROLOGICAL: No headaches, memory loss, loss of strength, numbness, or tremors  SKIN: No itching, burning, rashes, or lesions   LYMPH NODES: No enlarged lymph nodes  MUSCULOSKELETAL: No joint pain or swelling; No muscle, back, or extremity pain  PSYCHIATRIC: No depression, anxiety, mood swings, or difficulty sleeping  HEME/LYMPH: No easy bruising, or bleeding gums  ALLERGY AND IMMUNOLOGIC: No hives or eczema

## 2018-01-12 NOTE — ED PROVIDER NOTE - PROGRESS NOTE DETAILS
JESSICA Rahman: will obtain guaiac to r/o bleed given anemia and hx of AAA JESSICA duncan: spoke with dr. base, pending guiac, d/w Dr. Todd that is guiac + consider bleeding study inpatient and he agrees with plan. MAR paged. JESSICA duncan: spoke with dr. browning, accepted to service. pt declined guiac in ED. d/w MAR to consider for w/u of anemia however all looks chronic 2/2 to renal failure.

## 2018-01-12 NOTE — ED PROVIDER NOTE - MEDICAL DECISION MAKING DETAILS
83 yo M here for evaluation for SOB, weakness, anemia. Sent from [facility]/w MD, admit to Dr. Todd. Pending labs type cxr

## 2018-01-12 NOTE — ED ADULT TRIAGE NOTE - CHIEF COMPLAINT QUOTE
pt states "I am feeling weak"  states he is on home dialysis, last dialysis yesterday. seen at dialysis center, labs done . told Hgb 7.8.  c.o. "alittle " sob, denies chest pain. has hx of renal CA. rt AVG + bruit +thrill

## 2018-01-13 ENCOUNTER — TRANSCRIPTION ENCOUNTER (OUTPATIENT)
Age: 83
End: 2018-01-13

## 2018-01-13 LAB
ALBUMIN SERPL ELPH-MCNC: 3.3 G/DL — SIGNIFICANT CHANGE UP (ref 3.3–5)
ALP SERPL-CCNC: 72 U/L — SIGNIFICANT CHANGE UP (ref 40–120)
ALT FLD-CCNC: 12 U/L — SIGNIFICANT CHANGE UP (ref 4–41)
AST SERPL-CCNC: 19 U/L — SIGNIFICANT CHANGE UP (ref 4–40)
BASOPHILS # BLD AUTO: 0.02 K/UL — SIGNIFICANT CHANGE UP (ref 0–0.2)
BASOPHILS NFR BLD AUTO: 0.3 % — SIGNIFICANT CHANGE UP (ref 0–2)
BILIRUB SERPL-MCNC: 1.2 MG/DL — SIGNIFICANT CHANGE UP (ref 0.2–1.2)
BUN SERPL-MCNC: 19 MG/DL — SIGNIFICANT CHANGE UP (ref 7–23)
CALCIUM SERPL-MCNC: 8.5 MG/DL — SIGNIFICANT CHANGE UP (ref 8.4–10.5)
CHLORIDE SERPL-SCNC: 97 MMOL/L — LOW (ref 98–107)
CO2 SERPL-SCNC: 31 MMOL/L — SIGNIFICANT CHANGE UP (ref 22–31)
CREAT SERPL-MCNC: 3.91 MG/DL — HIGH (ref 0.5–1.3)
EOSINOPHIL # BLD AUTO: 0.05 K/UL — SIGNIFICANT CHANGE UP (ref 0–0.5)
EOSINOPHIL NFR BLD AUTO: 0.8 % — SIGNIFICANT CHANGE UP (ref 0–6)
GLUCOSE SERPL-MCNC: 79 MG/DL — SIGNIFICANT CHANGE UP (ref 70–99)
HCT VFR BLD CALC: 27.5 % — LOW (ref 39–50)
HGB BLD-MCNC: 8.9 G/DL — LOW (ref 13–17)
IMM GRANULOCYTES # BLD AUTO: 0.02 # — SIGNIFICANT CHANGE UP
IMM GRANULOCYTES NFR BLD AUTO: 0.3 % — SIGNIFICANT CHANGE UP (ref 0–1.5)
LYMPHOCYTES # BLD AUTO: 0.43 K/UL — LOW (ref 1–3.3)
LYMPHOCYTES # BLD AUTO: 6.9 % — LOW (ref 13–44)
MAGNESIUM SERPL-MCNC: 1.8 MG/DL — SIGNIFICANT CHANGE UP (ref 1.6–2.6)
MCHC RBC-ENTMCNC: 27.3 PG — SIGNIFICANT CHANGE UP (ref 27–34)
MCHC RBC-ENTMCNC: 32.4 % — SIGNIFICANT CHANGE UP (ref 32–36)
MCV RBC AUTO: 84.4 FL — SIGNIFICANT CHANGE UP (ref 80–100)
MONOCYTES # BLD AUTO: 0.58 K/UL — SIGNIFICANT CHANGE UP (ref 0–0.9)
MONOCYTES NFR BLD AUTO: 9.3 % — SIGNIFICANT CHANGE UP (ref 2–14)
NEUTROPHILS # BLD AUTO: 5.16 K/UL — SIGNIFICANT CHANGE UP (ref 1.8–7.4)
NEUTROPHILS NFR BLD AUTO: 82.4 % — HIGH (ref 43–77)
NRBC # FLD: 0 — SIGNIFICANT CHANGE UP
PHOSPHATE SERPL-MCNC: 2.7 MG/DL — SIGNIFICANT CHANGE UP (ref 2.5–4.5)
PLATELET # BLD AUTO: 140 K/UL — LOW (ref 150–400)
PMV BLD: 10.4 FL — SIGNIFICANT CHANGE UP (ref 7–13)
POTASSIUM SERPL-MCNC: 4 MMOL/L — SIGNIFICANT CHANGE UP (ref 3.5–5.3)
POTASSIUM SERPL-SCNC: 4 MMOL/L — SIGNIFICANT CHANGE UP (ref 3.5–5.3)
PROT SERPL-MCNC: 6.3 G/DL — SIGNIFICANT CHANGE UP (ref 6–8.3)
RBC # BLD: 3.26 M/UL — LOW (ref 4.2–5.8)
RBC # FLD: 15.8 % — HIGH (ref 10.3–14.5)
SODIUM SERPL-SCNC: 140 MMOL/L — SIGNIFICANT CHANGE UP (ref 135–145)
WBC # BLD: 6.26 K/UL — SIGNIFICANT CHANGE UP (ref 3.8–10.5)
WBC # FLD AUTO: 6.26 K/UL — SIGNIFICANT CHANGE UP (ref 3.8–10.5)

## 2018-01-13 RX ADMIN — Medication 81 MILLIGRAM(S): at 11:58

## 2018-01-13 RX ADMIN — Medication 200 MILLIGRAM(S): at 15:56

## 2018-01-13 RX ADMIN — Medication 1 TABLET(S): at 11:58

## 2018-01-13 RX ADMIN — Medication 200 MILLIGRAM(S): at 21:51

## 2018-01-13 RX ADMIN — Medication 200 MILLIGRAM(S): at 06:39

## 2018-01-13 RX ADMIN — CALCITRIOL 0.25 MICROGRAM(S): 0.5 CAPSULE ORAL at 11:58

## 2018-01-13 NOTE — DISCHARGE NOTE ADULT - MEDICATION SUMMARY - MEDICATIONS TO TAKE
I will START or STAY ON the medications listed below when I get home from the hospital:    aspirin 81 mg oral tablet  -- 1 tab(s) by mouth once a day  -- Indication: For CAD prophylaxis    labetalol 200 mg oral tablet  -- 1 tab(s) by mouth 3 times a day  -- Indication: For hypertension    Multiple Vitamins oral tablet  -- 1 tab(s) by mouth once a day  -- Indication: For supplement    calcitriol 0.25 mcg oral capsule  -- 1 cap(s) by mouth once a day  -- Indication: For supplement

## 2018-01-13 NOTE — PHYSICAL THERAPY INITIAL EVALUATION ADULT - ADDITIONAL COMMENTS
Pt. lives in a pvt apartment with their spouse. Pt. has (+) elevator access to enter home. Pt. was previously ambulating household and community distances without the use of an AD independently. Pt. was previously independent with ADLs. Pt. returned to bed at end of therapy session with all lines and tubes intact, call bell in reach and in NAD.

## 2018-01-13 NOTE — DISCHARGE NOTE ADULT - CARE PLAN
Principal Discharge DX:	Anemia  Instructions for follow-up, activity and diet:	You presented with anemia. You were transfused packed red blood cells with improvement. Follow up PCP within 1 week for repeat CBC to monitor Hgb levels and management.  Secondary Diagnosis:	CHF (congestive heart failure)  Secondary Diagnosis:	ESRD (end stage renal disease)  Secondary Diagnosis:	Essential Hypertension Principal Discharge DX:	Anemia  Assessment and plan of treatment:	You presented with anemia. You were transfused packed red blood cells with improvement. Follow up PCP within 1 week for repeat CBC to monitor Hgb levels and management.  Secondary Diagnosis:	CHF (congestive heart failure)  Secondary Diagnosis:	ESRD (end stage renal disease)  Assessment and plan of treatment:	Continue hemodialysis as you were previously.  Follow up with your nephrologist.  Secondary Diagnosis:	Essential Hypertension Principal Discharge DX:	Anemia  Assessment and plan of treatment:	You presented with anemia. You were transfused packed red blood cells with improvement. Follow up PCP within 1 week for repeat CBC to monitor Hgb levels and management.  Secondary Diagnosis:	CHF (congestive heart failure)  Assessment and plan of treatment:	Follow up with Dr. Cruz within 2 weeks of discharge.   Continue lasix therapy.  Secondary Diagnosis:	ESRD (end stage renal disease)  Assessment and plan of treatment:	Continue hemodialysis as you were previously.  Follow up with your nephrologist.  Secondary Diagnosis:	Essential Hypertension  Assessment and plan of treatment:	Continue blood pressure control medications. Monitor blood pressure. Follow up with cardiologist and PCP outpatient within 2 weeks of discharge. Principal Discharge DX:	Anemia  Goal:	resolved  Assessment and plan of treatment:	You presented with anemia. You were transfused packed red blood cells with improvement. Follow up PCP within 1 week for repeat CBC to monitor Hgb levels and management.  Secondary Diagnosis:	CHF (congestive heart failure)  Goal:	well controlled  Assessment and plan of treatment:	Follow up with Dr. Cruz within 2 weeks of discharge.   Continue lasix therapy.  Secondary Diagnosis:	ESRD (end stage renal disease)  Assessment and plan of treatment:	Continue hemodialysis as you were previously.  Follow up with your nephrologist.  Secondary Diagnosis:	Essential Hypertension  Assessment and plan of treatment:	Continue blood pressure control medications. Monitor blood pressure. Follow up with cardiologist and PCP outpatient within 2 weeks of discharge. Principal Discharge DX:	Anemia  Goal:	resolved  Assessment and plan of treatment:	You presented with anemia. You were transfused packed red blood cells with improvement. Follow up PCP within 1 week for repeat CBC to monitor Hgb levels and management.  Secondary Diagnosis:	CHF (congestive heart failure)  Goal:	well controlled  Assessment and plan of treatment:	Follow up with Dr. Cruz within 2 weeks of discharge.   Continue lasix therapy.  Follow up with Dr. Cruz 2/5/18 at 11;15 am - [facility] - cardiac clinic  Secondary Diagnosis:	ESRD (end stage renal disease)  Assessment and plan of treatment:	Continue hemodialysis as you were previously.  Follow up with your nephrologist.  Secondary Diagnosis:	Essential Hypertension  Assessment and plan of treatment:	Continue blood pressure control medications. Monitor blood pressure. Follow up with cardiologist and PCP outpatient within 2 weeks of discharge.

## 2018-01-13 NOTE — PHYSICAL THERAPY INITIAL EVALUATION ADULT - PATIENT PROFILE REVIEW, REHAB EVAL
yes/Pt. profile reviewed, consulted with MEGHAN BACH prior to initial PT evaluation and tx, as per RN, Pt. is OK to participate in skilled therapy session.

## 2018-01-13 NOTE — DISCHARGE NOTE ADULT - PLAN OF CARE
You presented with anemia. You were transfused packed red blood cells with improvement. Follow up PCP within 1 week for repeat CBC to monitor Hgb levels and management. Continue hemodialysis as you were previously.  Follow up with your nephrologist. Follow up with Dr. Cruz within 2 weeks of discharge.   Continue lasix therapy. Continue blood pressure control medications. Monitor blood pressure. Follow up with cardiologist and PCP outpatient within 2 weeks of discharge. resolved well controlled Follow up with Dr. Cruz within 2 weeks of discharge.   Continue lasix therapy.  Follow up with Dr. Cruz 2/5/18 at 11;15 am - [facility] - cardiac clinic

## 2018-01-13 NOTE — DISCHARGE NOTE ADULT - CARE PROVIDER_API CALL
Sigifredo Cruz), [facility]  [address] [place] [address]  Phone: [phone]  Fax: [phone] Sigifredo Cruz), [facility]  [address] [place] [address]  Phone: [phone]  Fax: [phone]    Dr. Marcelino,   Phone: (   )    -  Fax: (   )    -    Emerson Romano), Internal Medicine; Nephrology  [address] [address]  Phone: [phone]  Fax: [phone]

## 2018-01-13 NOTE — PHYSICAL THERAPY INITIAL EVALUATION ADULT - PERTINENT HX OF CURRENT PROBLEM, REHAB EVAL
Pt. is an 82 year old male admitted to [facility] secondary to anemia, PMH: ESRD, mitral valve disorder

## 2018-01-13 NOTE — DISCHARGE NOTE ADULT - HOSPITAL COURSE
82 M with PMH of Type B aortic dissection s/p repair in 2013 at [place], ESRD on HD at home M,T,Th,F, pulmonary hypertension, h/o AICD, h/o MVR in 2010 sent to ED after he was found to have a hemoglobin of 7.8.  Patient reports recent feelings of fatigue and weakness with some PABLO making ambulation more difficult. Denies pain in chest, back, arms or jaw. Denies nausea or diaphoresis and otherwise has felt well.  Wife bedside also reports decreased appetite lately.  Denies cough, hemoptysis or SOB baseline. Denies palpitations. 82 M with PMH of Type B aortic dissection s/p repair in 2013 at [place], ESRD on HD at home M,T,Th,F, pulmonary hypertension, h/o AICD, h/o MVR in 2010 sent to ED after he was found to have a hemoglobin of 7.8.  Patient reports recent feelings of fatigue and weakness with some PABLO making ambulation more difficult. Denies pain in chest, back, arms or jaw. Denies nausea or diaphoresis and otherwise has felt well.  Wife bedside also reports decreased appetite lately.  Denies cough, hemoptysis or SOB baseline. Denies palpitations.    Anemia in chronic kidney disease, on chronic dialysis.    -S/P 2 U PRBC   -No signs of blood loss or hemolysis.   1/15: 1 unit PRBC with HD    ESRD (end stage renal disease).    -HD M/T/Th/F    PHT (Pulmonary Hypertension).    -ECHO - Compared with echocardiogram of 3/3/2014, the aortic  regurgitation is now moderate to severe. There is severe  pulmonary hypertension.    Acute on chronic diastolic congestive heart failure.  -ECHO Compared with echocardiogram of 3/3/2014, the aortic  regurgitation is now moderate to severe. There is severe  pulmonary hypertension.  -Monitor volume status   -Cardio Cx - outpatient follow up    Essential Hypertension.    -Labetalol    dispo: home w- outpatient HD set up

## 2018-01-13 NOTE — DISCHARGE NOTE ADULT - PATIENT PORTAL LINK FT
“You can access the Follow[facility] Patient Portal, offered by [facility], by registering with the following website: [email or web address]”

## 2018-01-13 NOTE — DISCHARGE NOTE ADULT - CARE PROVIDERS DIRECT ADDRESSES
[email or web address],DirectAddress_Unknown,DirectAddress_Unknown

## 2018-01-13 NOTE — DISCHARGE NOTE ADULT - PROVIDER TOKENS
TOKEN:'[id number]' TOKEN:'[id number]',FREE:[LAST:[Dr. Marcelino],PHONE:[(   )    -],FAX:[(   )    -]],TOKEN:'[id number]'

## 2018-01-14 LAB
ALBUMIN SERPL ELPH-MCNC: 3.1 G/DL — LOW (ref 3.3–5)
ALP SERPL-CCNC: 102 U/L — SIGNIFICANT CHANGE UP (ref 40–120)
ALT FLD-CCNC: 18 U/L — SIGNIFICANT CHANGE UP (ref 4–41)
AST SERPL-CCNC: 27 U/L — SIGNIFICANT CHANGE UP (ref 4–40)
BASOPHILS # BLD AUTO: 0.02 K/UL — SIGNIFICANT CHANGE UP (ref 0–0.2)
BASOPHILS NFR BLD AUTO: 0.4 % — SIGNIFICANT CHANGE UP (ref 0–2)
BILIRUB SERPL-MCNC: 0.8 MG/DL — SIGNIFICANT CHANGE UP (ref 0.2–1.2)
BUN SERPL-MCNC: 37 MG/DL — HIGH (ref 7–23)
CALCIUM SERPL-MCNC: 8.3 MG/DL — LOW (ref 8.4–10.5)
CHLORIDE SERPL-SCNC: 97 MMOL/L — LOW (ref 98–107)
CO2 SERPL-SCNC: 30 MMOL/L — SIGNIFICANT CHANGE UP (ref 22–31)
CREAT SERPL-MCNC: 6.12 MG/DL — HIGH (ref 0.5–1.3)
EOSINOPHIL # BLD AUTO: 0.04 K/UL — SIGNIFICANT CHANGE UP (ref 0–0.5)
EOSINOPHIL NFR BLD AUTO: 0.7 % — SIGNIFICANT CHANGE UP (ref 0–6)
GLUCOSE SERPL-MCNC: 82 MG/DL — SIGNIFICANT CHANGE UP (ref 70–99)
HCT VFR BLD CALC: 27.6 % — LOW (ref 39–50)
HGB BLD-MCNC: 9 G/DL — LOW (ref 13–17)
IMM GRANULOCYTES # BLD AUTO: 0.02 # — SIGNIFICANT CHANGE UP
IMM GRANULOCYTES NFR BLD AUTO: 0.4 % — SIGNIFICANT CHANGE UP (ref 0–1.5)
LYMPHOCYTES # BLD AUTO: 0.48 K/UL — LOW (ref 1–3.3)
LYMPHOCYTES # BLD AUTO: 8.7 % — LOW (ref 13–44)
MCHC RBC-ENTMCNC: 27.4 PG — SIGNIFICANT CHANGE UP (ref 27–34)
MCHC RBC-ENTMCNC: 32.6 % — SIGNIFICANT CHANGE UP (ref 32–36)
MCV RBC AUTO: 83.9 FL — SIGNIFICANT CHANGE UP (ref 80–100)
MONOCYTES # BLD AUTO: 0.59 K/UL — SIGNIFICANT CHANGE UP (ref 0–0.9)
MONOCYTES NFR BLD AUTO: 10.6 % — SIGNIFICANT CHANGE UP (ref 2–14)
NEUTROPHILS # BLD AUTO: 4.39 K/UL — SIGNIFICANT CHANGE UP (ref 1.8–7.4)
NEUTROPHILS NFR BLD AUTO: 79.2 % — HIGH (ref 43–77)
NRBC # FLD: 0 — SIGNIFICANT CHANGE UP
PLATELET # BLD AUTO: 135 K/UL — LOW (ref 150–400)
PMV BLD: 10.5 FL — SIGNIFICANT CHANGE UP (ref 7–13)
POTASSIUM SERPL-MCNC: 4.8 MMOL/L — SIGNIFICANT CHANGE UP (ref 3.5–5.3)
POTASSIUM SERPL-SCNC: 4.8 MMOL/L — SIGNIFICANT CHANGE UP (ref 3.5–5.3)
PROT SERPL-MCNC: 6 G/DL — SIGNIFICANT CHANGE UP (ref 6–8.3)
RBC # BLD: 3.29 M/UL — LOW (ref 4.2–5.8)
RBC # FLD: 16.1 % — HIGH (ref 10.3–14.5)
SODIUM SERPL-SCNC: 140 MMOL/L — SIGNIFICANT CHANGE UP (ref 135–145)
WBC # BLD: 5.54 K/UL — SIGNIFICANT CHANGE UP (ref 3.8–10.5)
WBC # FLD AUTO: 5.54 K/UL — SIGNIFICANT CHANGE UP (ref 3.8–10.5)

## 2018-01-14 RX ORDER — ERYTHROPOIETIN 10000 [IU]/ML
3000 INJECTION, SOLUTION INTRAVENOUS; SUBCUTANEOUS ONCE
Qty: 0 | Refills: 0 | Status: COMPLETED | OUTPATIENT
Start: 2018-01-15 | End: 2018-01-15

## 2018-01-14 RX ADMIN — CALCITRIOL 0.25 MICROGRAM(S): 0.5 CAPSULE ORAL at 12:34

## 2018-01-14 RX ADMIN — Medication 200 MILLIGRAM(S): at 21:12

## 2018-01-14 RX ADMIN — Medication 200 MILLIGRAM(S): at 15:09

## 2018-01-14 RX ADMIN — Medication 200 MILLIGRAM(S): at 05:16

## 2018-01-14 RX ADMIN — Medication 81 MILLIGRAM(S): at 12:34

## 2018-01-14 RX ADMIN — Medication 1 TABLET(S): at 12:34

## 2018-01-15 LAB
ALBUMIN SERPL ELPH-MCNC: 3.1 G/DL — LOW (ref 3.3–5)
ALP SERPL-CCNC: 91 U/L — SIGNIFICANT CHANGE UP (ref 40–120)
ALT FLD-CCNC: 19 U/L — SIGNIFICANT CHANGE UP (ref 4–41)
AST SERPL-CCNC: 17 U/L — SIGNIFICANT CHANGE UP (ref 4–40)
BASOPHILS # BLD AUTO: 0.01 K/UL — SIGNIFICANT CHANGE UP (ref 0–0.2)
BASOPHILS NFR BLD AUTO: 0.2 % — SIGNIFICANT CHANGE UP (ref 0–2)
BILIRUB SERPL-MCNC: 0.6 MG/DL — SIGNIFICANT CHANGE UP (ref 0.2–1.2)
BUN SERPL-MCNC: 49 MG/DL — HIGH (ref 7–23)
BUN SERPL-MCNC: 49 MG/DL — HIGH (ref 7–23)
CALCIUM SERPL-MCNC: 8.1 MG/DL — LOW (ref 8.4–10.5)
CALCIUM SERPL-MCNC: 8.1 MG/DL — LOW (ref 8.4–10.5)
CHLORIDE SERPL-SCNC: 97 MMOL/L — LOW (ref 98–107)
CHLORIDE SERPL-SCNC: 97 MMOL/L — LOW (ref 98–107)
CO2 SERPL-SCNC: 26 MMOL/L — SIGNIFICANT CHANGE UP (ref 22–31)
CO2 SERPL-SCNC: 26 MMOL/L — SIGNIFICANT CHANGE UP (ref 22–31)
CREAT SERPL-MCNC: 7.66 MG/DL — HIGH (ref 0.5–1.3)
CREAT SERPL-MCNC: 7.66 MG/DL — HIGH (ref 0.5–1.3)
EOSINOPHIL # BLD AUTO: 0.04 K/UL — SIGNIFICANT CHANGE UP (ref 0–0.5)
EOSINOPHIL NFR BLD AUTO: 0.8 % — SIGNIFICANT CHANGE UP (ref 0–6)
GLUCOSE SERPL-MCNC: 84 MG/DL — SIGNIFICANT CHANGE UP (ref 70–99)
GLUCOSE SERPL-MCNC: 84 MG/DL — SIGNIFICANT CHANGE UP (ref 70–99)
HCT VFR BLD CALC: 26.7 % — LOW (ref 39–50)
HCT VFR BLD CALC: 26.7 % — LOW (ref 39–50)
HGB BLD-MCNC: 8.4 G/DL — LOW (ref 13–17)
HGB BLD-MCNC: 8.4 G/DL — LOW (ref 13–17)
IMM GRANULOCYTES # BLD AUTO: 0.02 # — SIGNIFICANT CHANGE UP
IMM GRANULOCYTES NFR BLD AUTO: 0.4 % — SIGNIFICANT CHANGE UP (ref 0–1.5)
LYMPHOCYTES # BLD AUTO: 0.43 K/UL — LOW (ref 1–3.3)
LYMPHOCYTES # BLD AUTO: 8.3 % — LOW (ref 13–44)
MAGNESIUM SERPL-MCNC: 1.9 MG/DL — SIGNIFICANT CHANGE UP (ref 1.6–2.6)
MCHC RBC-ENTMCNC: 26.2 PG — LOW (ref 27–34)
MCHC RBC-ENTMCNC: 26.2 PG — LOW (ref 27–34)
MCHC RBC-ENTMCNC: 31.5 % — LOW (ref 32–36)
MCHC RBC-ENTMCNC: 31.5 % — LOW (ref 32–36)
MCV RBC AUTO: 83.2 FL — SIGNIFICANT CHANGE UP (ref 80–100)
MCV RBC AUTO: 83.2 FL — SIGNIFICANT CHANGE UP (ref 80–100)
MONOCYTES # BLD AUTO: 0.52 K/UL — SIGNIFICANT CHANGE UP (ref 0–0.9)
MONOCYTES NFR BLD AUTO: 10 % — SIGNIFICANT CHANGE UP (ref 2–14)
NEUTROPHILS # BLD AUTO: 4.17 K/UL — SIGNIFICANT CHANGE UP (ref 1.8–7.4)
NEUTROPHILS NFR BLD AUTO: 80.3 % — HIGH (ref 43–77)
NRBC # FLD: 0 — SIGNIFICANT CHANGE UP
NRBC # FLD: 0 — SIGNIFICANT CHANGE UP
PHOSPHATE SERPL-MCNC: 3.9 MG/DL — SIGNIFICANT CHANGE UP (ref 2.5–4.5)
PLATELET # BLD AUTO: 124 K/UL — LOW (ref 150–400)
PLATELET # BLD AUTO: 124 K/UL — LOW (ref 150–400)
PMV BLD: 10.2 FL — SIGNIFICANT CHANGE UP (ref 7–13)
PMV BLD: 10.2 FL — SIGNIFICANT CHANGE UP (ref 7–13)
POTASSIUM SERPL-MCNC: 4.8 MMOL/L — SIGNIFICANT CHANGE UP (ref 3.5–5.3)
POTASSIUM SERPL-MCNC: 4.8 MMOL/L — SIGNIFICANT CHANGE UP (ref 3.5–5.3)
POTASSIUM SERPL-SCNC: 4.8 MMOL/L — SIGNIFICANT CHANGE UP (ref 3.5–5.3)
POTASSIUM SERPL-SCNC: 4.8 MMOL/L — SIGNIFICANT CHANGE UP (ref 3.5–5.3)
PROT SERPL-MCNC: 5.9 G/DL — LOW (ref 6–8.3)
RBC # BLD: 3.21 M/UL — LOW (ref 4.2–5.8)
RBC # BLD: 3.21 M/UL — LOW (ref 4.2–5.8)
RBC # FLD: 16 % — HIGH (ref 10.3–14.5)
RBC # FLD: 16 % — HIGH (ref 10.3–14.5)
SODIUM SERPL-SCNC: 139 MMOL/L — SIGNIFICANT CHANGE UP (ref 135–145)
SODIUM SERPL-SCNC: 139 MMOL/L — SIGNIFICANT CHANGE UP (ref 135–145)
WBC # BLD: 5.19 K/UL — SIGNIFICANT CHANGE UP (ref 3.8–10.5)
WBC # BLD: 5.19 K/UL — SIGNIFICANT CHANGE UP (ref 3.8–10.5)
WBC # FLD AUTO: 5.19 K/UL — SIGNIFICANT CHANGE UP (ref 3.8–10.5)
WBC # FLD AUTO: 5.19 K/UL — SIGNIFICANT CHANGE UP (ref 3.8–10.5)

## 2018-01-15 PROCEDURE — 99223 1ST HOSP IP/OBS HIGH 75: CPT

## 2018-01-15 PROCEDURE — 93306 TTE W/DOPPLER COMPLETE: CPT | Mod: 26

## 2018-01-15 RX ADMIN — Medication 1 TABLET(S): at 12:49

## 2018-01-15 RX ADMIN — Medication 200 MILLIGRAM(S): at 12:50

## 2018-01-15 RX ADMIN — ERYTHROPOIETIN 3000 UNIT(S): 10000 INJECTION, SOLUTION INTRAVENOUS; SUBCUTANEOUS at 16:13

## 2018-01-15 RX ADMIN — Medication 200 MILLIGRAM(S): at 06:00

## 2018-01-15 RX ADMIN — Medication 200 MILLIGRAM(S): at 21:16

## 2018-01-15 RX ADMIN — CALCITRIOL 0.25 MICROGRAM(S): 0.5 CAPSULE ORAL at 12:49

## 2018-01-15 RX ADMIN — Medication 81 MILLIGRAM(S): at 12:49

## 2018-01-15 NOTE — CONSULT NOTE ADULT - SUBJECTIVE AND OBJECTIVE BOX
Date of Admission: 1/12/2018    CHIEF COMPLAINT: weakness and fatigue with intermittent PABLO    HISTORY OF PRESENT ILLNESS: 82M with type B aortic dissection, s/p repair 2013, ESRD on HD at home, HFpEF, s/p ICD, pHTN, and bioprosthetic MVR presents with fatigue and weakness associated with intermittent PABLO and poor appetite. Patient presented from home and was noted to be anemic with H&H 7.2/23.5. He received 2U PRBCs with good response and has remained euvolemic. An echocardiogram was performed and findings indicated moderate to severe AR which was not present on last Echo of 11/2016, patient remains asymptomatic. Cardiology called to clear for discharge home.      Allergies    kiwi (Swelling)  No Known Drug Allergies    Intolerances    	    MEDICATIONS:  aspirin  chewable 81 milliGRAM(s) Oral daily  labetalol 200 milliGRAM(s) Oral three times a day        calcitriol   Capsule 0.25 MICROGram(s) Oral daily  epoetin shawna Injectable 3000 Unit(s) IV Push once  multivitamin 1 Tablet(s) Oral daily      PAST MEDICAL & SURGICAL HISTORY:  Mitral valve disorder  BPH (benign prostatic hypertrophy)  Osteoarthritis: left knee.  CKD (chronic kidney disease)  Aortic dissection  Pacemaker: 10/2010, BIV ICD upgrade in 9/2015  Renal Carcinoma  Intracranial Subdural Hematoma: 1991 - no deficits  Essential Hypertension  PHT (Pulmonary Hypertension)  CHF (Congestive Heart Failure)  Aortic dissection distal to left subclavian: s/p repair 2013  CRF (chronic renal failure): AV fistula right upper arm inserted in 2016 May  Chronic kidney disease (CKD): pt s/p Right AVF, s/p Right First stage basilic vein transposition  Chronic kidney disease (CKD): s/p Right AVF 2/16, 4/16  History of cystoscopy: cryoablation  of prostate 2014  Cataract, bilateral: right 3/2015, right  5/2015  Pacemaker: 2010 [employer] model #2210, with upgrade to AICD in 2015 [employer] model #KY6668  Mitral valve replaced: Bioprosthetic - Dr. Roy 2010  S/P Craniotomy: 1991  S/P Nephrectomy: right partial nephrectomy 2006      FAMILY HISTORY:  Family history of aortic dissection (Sibling)  Family history of stroke (Sibling)  Family history of heart failure (Sibling)  Hypertension (Sibling)  Diabetes mellitus (Sibling)  Family history of CVA      SOCIAL HISTORY:    Smoker  denies  Alcohol  denies  Drugs  denies      REVIEW OF SYSTEMS:  See HPI. Otherwise, 10 point ROS done and otherwise negative.    PHYSICAL EXAM:  T(C): 36.6 (01-15-18 @ 12:48), Max: 36.6 (01-14-18 @ 20:57)  HR: 60 (01-15-18 @ 12:48) (59 - 60)  BP: 132/62 (01-15-18 @ 12:48) (132/62 - 146/70)  RR: 18 (01-15-18 @ 12:48) (18 - 19)  SpO2: 100% (01-15-18 @ 12:48) (98% - 100%)  Wt(kg): --  I&O's Summary    14 Jan 2018 07:01  -  15 Turner 2018 07:00  --------------------------------------------------------  IN: 180 mL / OUT: 80 mL / NET: 100 mL        Appearance: Normal	  HEENT:   Normal oral mucosa, PERRL, EOMI	  Cardiovascular: nL s1s2, no JVD  Respiratory: CTA B/L	  Psychiatry: A & O x 3, Mood & affect appropriate  Gastrointestinal:  Soft, Non-tender, + BS	  Skin: No rashes, No ecchymoses, No cyanosis	  Neurologic: Non-focal  Extremities: no C/C/E      LABS:	 	                        8.4    5.19  )-----------( 124      ( 15 Turner 2018 06:56 )             26.7       01-15    139  |  97<L>  |  49<H>  ----------------------------<  84  4.8   |  26  |  7.66<H>    01-14    140  |  97<L>  |  37<H>  ----------------------------<  82  4.8   |  30  |  6.12<H>    Ca    8.1<L>      15 Turner 2018 06:56  Ca    8.3<L>      14 Jan 2018 07:08  Phos  3.9     01-15  Mg     1.9     01-15    TPro  5.9<L>  /  Alb  3.1<L>  /  TBili  0.6  /  DBili  x   /  AST  17  /  ALT  19  /  AlkPhos  91  01-15  TPro  6.0  /  Alb  3.1<L>  /  TBili  0.8  /  DBili  x   /  AST  27  /  ALT  18  /  AlkPhos  102  01-14        TELEMETRY: 	not on telemetry      ECG:  	no EKG in chart      PREVIOUS DIAGNOSTIC TESTING:    [ ] Echocardiogram:  Patient name: Cj Taylor  YOB: 1935   Age: 82 (M)   MR#: [id number]  Study Date: 1/15/2018  [facility]  Study quality: Technically good  Referring Physician: Mohan Todd MD  Blood Pressure: 146/70 mmHg  Height: 165 cm  Weight: 58 kg  BSA: 1.6 m2  ------------------------------------------------------------------------  PROCEDURE: Transthoracic echocardiogram with 2-D, M-Mode  and complete spectral and color flow Doppler.  INDICATION: Heart failure, unspecified (I50.9)  ------------------------------------------------------------------------  DIMENSIONS:  Dimensions:     Normal Values:  LA:     4.0 cm    2.0 - 4.0 cm  Ao:     3.5 cm    2.0 - 3.8 cm  SEPTUM: 1.5 cm    0.6 - 1.2 cm  PWT:    1.2 cm    0.6 - 1.1 cm  LVIDd:  4.1 cm    3.0 - 5.6 cm  LVIDs:  2.3 cm    1.8 - 4.0 cm  Derived Variables:  LVMI: 125 g/m2  RWT: 0.58  Fractional short: 44 %  Ejection Fraction (Teicholtz): 76 %  ------------------------------------------------------------------------  OBSERVATIONS:  Mitral Valve: Bioprosthetic mitral valve replacement.  Mild-moderate mitral regurgitation. Mean transmitral valve  gradient equals 7 mm Hg, which is elevated even in the  setting of a prosthetic valve.  Aortic Root: Normal aortic root.  Aortic Valve: Calcified trileaflet aortic valve with normal  opening. Moderate-severe aortic regurgitation.  Left Atrium: Severely dilated left atrium.  LA volume index  = 49 cc/m2.  Left Ventricle: Normal left ventricular systolic function.  No segmental wall motion abnormalities. Moderate concentric  left ventricular hypertrophy.  Right Heart: Normal right atrium. A device wire is noted in  the right heart. Normal right ventricular size with  decreased right ventricular systolic function. Normal  tricuspid valve.  Severe tricuspid regurgitation. Normal  pulmonic valve.  Pericardium/PleuraNormal pericardium with no pericardial  effusion.  Hemodynamic: Estimated right ventricular systolic pressure  equals 76 mm Hg, assuming right atrial pressure equals 10  mm Hg, consistent with severe pulmonary hypertension.  ------------------------------------------------------------------------  CONCLUSIONS:  1. Bioprosthetic mitral valve replacement. Mild-moderate  mitral regurgitation. Mean transmitral valve gradient  equals 7 mm Hg, which is elevated even in the setting of a  prosthetic valve.  2. Calcified trileaflet aortic valve with normal opening.  Moderate-severe aortic regurgitation.  3. Severely dilated left atrium.  LA volume index = 49  cc/m2.  4. Moderate concentric left ventricular hypertrophy.  5. Normal left ventricular systolic function. No segmental  wall motion abnormalities.  6. Normal right atrium. A device wire is noted in the right  heart.  7. Normal rightventricular size with decreased right  ventricular systolic function.  8. Normal tricuspid valve.  Severe tricuspid regurgitation.  9. Estimated pulmonary artery systolic pressure equals 76  mm Hg, assuming right atrial pressure equals 10  mm Hg,  consistent with severe pulmonary hypertension.  *** Compared with echocardiogram of 3/3/2014, the aortic  regurgitation is now moderate to severe. There is severe  pulmonary hypertension.  ------------------------------------------------------------------------  Confirmed on  1/15/2018 - 11:40:14 by Criss Cesar MD  ------------------------------------------------------------------------

## 2018-01-15 NOTE — CONSULT NOTE ADULT - ASSESSMENT
82M with type B aortic dissection, s/p repair, ESRD on home HD, HFpEF, s/p ICD, pHTN, and s/p bioprosthetic MVR presents with PABLO, weakness, and fatigue, noted to be anemic. Now s/p 2U PRBCs with good response. Echo with new moderate to severe AR but patient appears euvolemic and asymptomatic. He states he is scheduled within the next two weeks for HF/cardio and device clinic followup.    D/W Dr. Lomeli, cardio fellow: as patient is asymptomatic without signs or symptoms of active decompensated HF, would plan discharge and continued outpatient followup. 82M with type B aortic dissection, s/p repair, ESRD on home HD, HFpEF, s/p ICD, pHTN, and s/p bioprosthetic MVR presents with PABLO, weakness, and fatigue, noted to be anemic. Now s/p 2U PRBCs with good response. Echo with new moderate to severe AR but patient appears euvolemic and asymptomatic. He states he is scheduled within the next two weeks for HF/cardio and device clinic followup.    As patient is asymptomatic without signs or symptoms of active decompensated HF, would plan discharge and continued outpatient followup.  Patient follows up with Dr. Sigifredo Cruz in the office.

## 2018-01-16 VITALS
RESPIRATION RATE: 17 BRPM | SYSTOLIC BLOOD PRESSURE: 135 MMHG | DIASTOLIC BLOOD PRESSURE: 66 MMHG | TEMPERATURE: 99 F | HEART RATE: 66 BPM | OXYGEN SATURATION: 99 %

## 2018-01-16 LAB
ALBUMIN SERPL ELPH-MCNC: 3.2 G/DL — LOW (ref 3.3–5)
ALP SERPL-CCNC: 94 U/L — SIGNIFICANT CHANGE UP (ref 40–120)
ALT FLD-CCNC: 16 U/L — SIGNIFICANT CHANGE UP (ref 4–41)
AST SERPL-CCNC: 15 U/L — SIGNIFICANT CHANGE UP (ref 4–40)
BASOPHILS # BLD AUTO: 0.02 K/UL — SIGNIFICANT CHANGE UP (ref 0–0.2)
BASOPHILS NFR BLD AUTO: 0.4 % — SIGNIFICANT CHANGE UP (ref 0–2)
BILIRUB SERPL-MCNC: 0.8 MG/DL — SIGNIFICANT CHANGE UP (ref 0.2–1.2)
BUN SERPL-MCNC: 27 MG/DL — HIGH (ref 7–23)
CALCIUM SERPL-MCNC: 8.2 MG/DL — LOW (ref 8.4–10.5)
CHLORIDE SERPL-SCNC: 97 MMOL/L — LOW (ref 98–107)
CO2 SERPL-SCNC: 30 MMOL/L — SIGNIFICANT CHANGE UP (ref 22–31)
CREAT SERPL-MCNC: 5.44 MG/DL — HIGH (ref 0.5–1.3)
EOSINOPHIL # BLD AUTO: 0.03 K/UL — SIGNIFICANT CHANGE UP (ref 0–0.5)
EOSINOPHIL NFR BLD AUTO: 0.5 % — SIGNIFICANT CHANGE UP (ref 0–6)
GLUCOSE SERPL-MCNC: 92 MG/DL — SIGNIFICANT CHANGE UP (ref 70–99)
HCT VFR BLD CALC: 30.2 % — LOW (ref 39–50)
HGB BLD-MCNC: 9.6 G/DL — LOW (ref 13–17)
IMM GRANULOCYTES # BLD AUTO: 0.03 # — SIGNIFICANT CHANGE UP
IMM GRANULOCYTES NFR BLD AUTO: 0.5 % — SIGNIFICANT CHANGE UP (ref 0–1.5)
LYMPHOCYTES # BLD AUTO: 0.47 K/UL — LOW (ref 1–3.3)
LYMPHOCYTES # BLD AUTO: 8.5 % — LOW (ref 13–44)
MCHC RBC-ENTMCNC: 26.6 PG — LOW (ref 27–34)
MCHC RBC-ENTMCNC: 31.8 % — LOW (ref 32–36)
MCV RBC AUTO: 83.7 FL — SIGNIFICANT CHANGE UP (ref 80–100)
MONOCYTES # BLD AUTO: 0.56 K/UL — SIGNIFICANT CHANGE UP (ref 0–0.9)
MONOCYTES NFR BLD AUTO: 10.2 % — SIGNIFICANT CHANGE UP (ref 2–14)
NEUTROPHILS # BLD AUTO: 4.39 K/UL — SIGNIFICANT CHANGE UP (ref 1.8–7.4)
NEUTROPHILS NFR BLD AUTO: 79.9 % — HIGH (ref 43–77)
NRBC # FLD: 0 — SIGNIFICANT CHANGE UP
PLATELET # BLD AUTO: 137 K/UL — LOW (ref 150–400)
PMV BLD: 10.7 FL — SIGNIFICANT CHANGE UP (ref 7–13)
POTASSIUM SERPL-MCNC: 4.3 MMOL/L — SIGNIFICANT CHANGE UP (ref 3.5–5.3)
POTASSIUM SERPL-SCNC: 4.3 MMOL/L — SIGNIFICANT CHANGE UP (ref 3.5–5.3)
PROT SERPL-MCNC: 6.2 G/DL — SIGNIFICANT CHANGE UP (ref 6–8.3)
RBC # BLD: 3.61 M/UL — LOW (ref 4.2–5.8)
RBC # FLD: 16 % — HIGH (ref 10.3–14.5)
SODIUM SERPL-SCNC: 140 MMOL/L — SIGNIFICANT CHANGE UP (ref 135–145)
WBC # BLD: 5.5 K/UL — SIGNIFICANT CHANGE UP (ref 3.8–10.5)
WBC # FLD AUTO: 5.5 K/UL — SIGNIFICANT CHANGE UP (ref 3.8–10.5)

## 2018-01-16 RX ADMIN — Medication 200 MILLIGRAM(S): at 13:41

## 2018-01-16 RX ADMIN — Medication 200 MILLIGRAM(S): at 05:27

## 2018-01-16 RX ADMIN — Medication 1 TABLET(S): at 12:27

## 2018-01-16 RX ADMIN — Medication 81 MILLIGRAM(S): at 12:26

## 2018-01-16 RX ADMIN — CALCITRIOL 0.25 MICROGRAM(S): 0.5 CAPSULE ORAL at 12:26

## 2018-01-16 NOTE — DIETITIAN INITIAL EVALUATION ADULT. - OTHER INFO
Pt. reports variable PO intake.  Upon asking, replies that he has chronically decrease appetite "since 2013".  States he ate well this morning at breakfast, which RN also reports.  Pt. denies nausea/vomiting/diarrhea/constipation, or issues with chewing/swallowing.  Food allergy to kiwi.  Drinks Nepro occasionally PTA & is amenable to consume up to 1x daily at this time.  States usual body weight (as noted above)... reports weight fluctuations, but is not specific as to amount.  Also states he does not weigh himself regularly (however noted ESRD on HD??).  Most recent dry weight would suggest possible decrease from usual body weight, although this is questionable.   No noted pressure ulcers, 1+ left arm edema.

## 2018-01-16 NOTE — DIETITIAN INITIAL EVALUATION ADULT. - NS AS NUTRI INTERV ED CONTENT
Nutrition relationship to health/disease/Recommended modifications/Priority modifications/Purpose of the nutrition education

## 2018-01-16 NOTE — PROGRESS NOTE ADULT - PROVIDER SPECIALTY LIST ADULT
Internal Medicine
Nephrology
Internal Medicine

## 2018-01-16 NOTE — DIETITIAN INITIAL EVALUATION ADULT. - NS AS NUTRI INTERV COLLABORAT
1) Change diet to sodium restricted + Nepro 8oz PO 1x daily.                2)Obtain pre/post HD weights                3)RDN remains available.  Omaira Ramírez RDN, CD/N  pager [phone]

## 2018-01-16 NOTE — PROGRESS NOTE ADULT - SUBJECTIVE AND OBJECTIVE BOX
CHIEF COMPLAINT:Patient is a 82y old  Male who presents with a chief complaint of Anemia (13 Jan 2018 17:10)    pt feeling well      PAST MEDICAL & SURGICAL HISTORY:  Mitral valve disorder  BPH (benign prostatic hypertrophy)  Osteoarthritis: left knee.  CKD (chronic kidney disease)  Aortic dissection  Pacemaker: 10/2010, BIV ICD upgrade in 9/2015  Renal Carcinoma  Intracranial Subdural Hematoma: 1991 - no deficits  Essential Hypertension  PHT (Pulmonary Hypertension)  CHF (Congestive Heart Failure)  Aortic dissection distal to left subclavian: s/p repair 2013  CRF (chronic renal failure): AV fistula right upper arm inserted in 2016 May  Chronic kidney disease (CKD): pt s/p Right AVF, s/p Right First stage basilic vein transposition  Chronic kidney disease (CKD): s/p Right AVF 2/16, 4/16  History of cystoscopy: cryoablation  of prostate 2014  Cataract, bilateral: right 3/2015, right  5/2015  Pacemaker: 2010 [employer] model #2210, with upgrade to AICD in 2015 [employer] model #[id number]  Mitral valve replaced: Bioprosthetic - Dr. Roy 2010  S/P Craniotomy: 1991  S/P Nephrectomy: right partial nephrectomy 2006          REVIEW OF SYSTEMS:  CONSTITUTIONAL: weak  EYES: No eye pain, visual disturbances, or discharge  NECK: No pain or stiffness  RESPIRATORY: No cough, wheezing, chills or hemoptysis; No Shortness of Breath  CARDIOVASCULAR: No chest pain, palpitations, passing out, dizziness, or leg swelling  GASTROINTESTINAL: No abdominal or epigastric pain. No nausea, vomiting, or hematemesis; No diarrhea or constipation. No melena or hematochezia.  GENITOURINARY: No dysuria, frequency, hematuria, or incontinence  NEUROLOGICAL: No headaches, memory loss, loss of strength, numbness, or tremors  MUSCULOSKELETAL: No joint pain or swelling; No muscle, back, or extremity pain        Medications:  MEDICATIONS  (STANDING):  aspirin  chewable 81 milliGRAM(s) Oral daily  calcitriol   Capsule 0.25 MICROGram(s) Oral daily  labetalol 200 milliGRAM(s) Oral three times a day  multivitamin 1 Tablet(s) Oral daily    MEDICATIONS  (PRN):    	    PHYSICAL EXAM:  T(C): 37.3 (01-16-18 @ 05:26), Max: 37.3 (01-16-18 @ 05:26)  HR: 62 (01-16-18 @ 05:26) (59 - 69)  BP: 141/65 (01-16-18 @ 05:26) (110/55 - 164/73)  RR: 18 (01-16-18 @ 05:26) (17 - 18)  SpO2: 99% (01-16-18 @ 05:26) (99% - 99%)  Wt(kg): --  I&O's Summary    15 Turner 2018 07:01  -  16 Jan 2018 07:00  --------------------------------------------------------  IN: 700 mL / OUT: 3300 mL / NET: -2600 mL      Appearance: Normal	  HEENT:   Normal oral mucosa, PERRL, EOMI	  Lymphatic: No lymphadenopathy  Cardiovascular: Normal S1 S2, No JVD, No murmurs, No edema  Respiratory: Lungs clear to auscultation	  Psychiatry: A & O x 3, Mood & affect appropriate  Gastrointestinal:  Soft, Non-tender, + BS	  Skin: No rashes, No ecchymoses, No cyanosis	  Neurologic: Non-focal  Extremities: Normal range of motion, No clubbing, cyanosis or edema  Vascular: Peripheral pulses palpable 2+ bilaterally    LABS:	 	    CARDIAC MARKERS:                                9.6    5.50  )-----------( 137      ( 16 Jan 2018 06:40 )             30.2     01-16    140  |  97<L>  |  27<H>  ----------------------------<  92  4.3   |  30  |  5.44<H>    Ca    8.2<L>      16 Jan 2018 06:40  Phos  3.9     01-15  Mg     1.9     01-15    TPro  6.2  /  Alb  3.2<L>  /  TBili  0.8  /  DBili  x   /  AST  15  /  ALT  16  /  AlkPhos  94  01-16    proBNP:   Lipid Profile:   HgA1c:   TSH:
CHIEF COMPLAINT:Patient is a 82y old  Male who presents with a chief complaint of Anemia (12 Jan 2018 17:00)    	        PAST MEDICAL & SURGICAL HISTORY:  Mitral valve disorder  BPH (benign prostatic hypertrophy)  Osteoarthritis: left knee.  CKD (chronic kidney disease)  Aortic dissection  Pacemaker: 10/2010, BIV ICD upgrade in 9/2015  Renal Carcinoma  Intracranial Subdural Hematoma: 1991 - no deficits  Essential Hypertension  PHT (Pulmonary Hypertension)  CHF (Congestive Heart Failure)  Aortic dissection distal to left subclavian: s/p repair 2013  CRF (chronic renal failure): AV fistula right upper arm inserted in 2016 May  Chronic kidney disease (CKD): pt s/p Right AVF, s/p Right First stage basilic vein transposition  Chronic kidney disease (CKD): s/p Right AVF 2/16, 4/16  History of cystoscopy: cryoablation  of prostate 2014  Cataract, bilateral: right 3/2015, right  5/2015  Pacemaker: 2010 [employer] model #2210, with upgrade to AICD in 2015 [employer] model #[id number]  Mitral valve replaced: Bioprosthetic - Dr. Roy 2010  S/P Craniotomy: 1991  S/P Nephrectomy: right partial nephrectomy 2006          REVIEW OF SYSTEMS:  CONSTITUTIONAL: weak  EYES: No eye pain, visual disturbances, or discharge  NECK: No pain or stiffness  RESPIRATORY: No cough, wheezing, chills or hemoptysis; No Shortness of Breath  CARDIOVASCULAR: No chest pain, palpitations, passing out, dizziness, or leg swelling  GASTROINTESTINAL: No abdominal or epigastric pain. No nausea, vomiting, or hematemesis; No diarrhea or constipation. No melena or hematochezia.  GENITOURINARY: No dysuria, frequency, hematuria, or incontinence  NEUROLOGICAL: No headaches, memory loss, loss of strength, numbness, or tremors  MUSCULOSKELETAL: No joint pain or swelling; No muscle, back, or extremity pain    Medications:  MEDICATIONS  (STANDING):  aspirin  chewable 81 milliGRAM(s) Oral daily  calcitriol   Capsule 0.25 MICROGram(s) Oral daily  labetalol 200 milliGRAM(s) Oral three times a day  multivitamin 1 Tablet(s) Oral daily    MEDICATIONS  (PRN):    	    PHYSICAL EXAM:  T(C): 36.3 (01-13-18 @ 06:38), Max: 37.2 (01-12-18 @ 14:10)  HR: 60 (01-13-18 @ 06:38) (55 - 117)  BP: 141/71 (01-13-18 @ 06:38) (104/54 - 141/71)  RR: 18 (01-13-18 @ 06:38) (16 - 18)  SpO2: 97% (01-13-18 @ 06:38) (95% - 100%)  Wt(kg): --  I&O's Summary    12 Jan 2018 07:01  -  13 Jan 2018 07:00  --------------------------------------------------------  IN: 1300 mL / OUT: 1400 mL / NET: -100 mL        Appearance: Normal	  HEENT:   Normal oral mucosa, PERRL, EOMI	  Lymphatic: No lymphadenopathy  Cardiovascular: Normal S1 S2, No JVD, No murmurs, No edema  Respiratory: Lungs clear to auscultation	  Psychiatry: A & O x 3, Mood & affect appropriate  Gastrointestinal:  Soft, Non-tender, + BS	  Skin: No rashes, No ecchymoses, No cyanosis	  Neurologic: Non-focal  Extremities: Normal range of motion, No clubbing, cyanosis or edema  Vascular: Peripheral pulses palpable 2+ bilaterally    TELEMETRY: 	    ECG:  	  RADIOLOGY:  OTHER: 	  	  LABS:	 	    CARDIAC MARKERS:                                7.2    9.83  )-----------( 157      ( 12 Jan 2018 15:30 )             23.5     01-12    139  |  95<L>  |  39<H>  ----------------------------<  97  5.5<H>   |  31  |  6.26<H>    Ca    8.6      12 Jan 2018 15:30    TPro  6.5  /  Alb  3.5  /  TBili  0.8  /  DBili  x   /  AST  30  /  ALT  11  /  AlkPhos  71  01-12    proBNP: Serum Pro-Brain Natriuretic Peptide: > 00467 pg/mL (01-12 @ 15:30)    Lipid Profile:   HgA1c:   TSH: Thyroid Stimulating Hormone, Serum: 8.57 uIU/mL (01-12 @ 15:30)
CHIEF COMPLAINT:Patient is a 82y old  Male who presents with a chief complaint of Anemia (13 Jan 2018 17:10)    	        PAST MEDICAL & SURGICAL HISTORY:  Mitral valve disorder  BPH (benign prostatic hypertrophy)  Osteoarthritis: left knee.  CKD (chronic kidney disease)  Aortic dissection  Pacemaker: 10/2010, BIV ICD upgrade in 9/2015  Renal Carcinoma  Intracranial Subdural Hematoma: 1991 - no deficits  Essential Hypertension  PHT (Pulmonary Hypertension)  CHF (Congestive Heart Failure)  Aortic dissection distal to left subclavian: s/p repair 2013  CRF (chronic renal failure): AV fistula right upper arm inserted in 2016 May  Chronic kidney disease (CKD): pt s/p Right AVF, s/p Right First stage basilic vein transposition  Chronic kidney disease (CKD): s/p Right AVF 2/16, 4/16  History of cystoscopy: cryoablation  of prostate 2014  Cataract, bilateral: right 3/2015, right  5/2015  Pacemaker: 2010 [employer] model #2210, with upgrade to AICD in 2015 [employer] model #[id number]  Mitral valve replaced: Bioprosthetic - Dr. Roy 2010  S/P Craniotomy: 1991  S/P Nephrectomy: right partial nephrectomy 2006          REVIEW OF SYSTEMS:  CONSTITUTIONAL: weak  EYES: No eye pain, visual disturbances, or discharge  NECK: No pain or stiffness  RESPIRATORY: No cough, wheezing, chills or hemoptysis; No Shortness of Breath  CARDIOVASCULAR: No chest pain, palpitations, passing out, dizziness, or leg swelling  GASTROINTESTINAL: No abdominal or epigastric pain. No nausea, vomiting, or hematemesis; No diarrhea or constipation. No melena or hematochezia.  GENITOURINARY: No dysuria, frequency, hematuria, or incontinence  NEUROLOGICAL: No headaches, memory loss, loss of strength, numbness, or tremors  MUSCULOSKELETAL: No joint pain or swelling; No muscle, back, or extremity pain    Medications:  MEDICATIONS  (STANDING):  aspirin  chewable 81 milliGRAM(s) Oral daily  calcitriol   Capsule 0.25 MICROGram(s) Oral daily  labetalol 200 milliGRAM(s) Oral three times a day  multivitamin 1 Tablet(s) Oral daily    MEDICATIONS  (PRN):    	    PHYSICAL EXAM:  T(C): 36.7 (01-14-18 @ 04:55), Max: 36.8 (01-13-18 @ 21:45)  HR: 60 (01-14-18 @ 04:55) (53 - 62)  BP: 147/60 (01-14-18 @ 04:55) (136/70 - 150/72)  RR: 18 (01-14-18 @ 04:55) (18 - 18)  SpO2: 99% (01-14-18 @ 04:55) (99% - 100%)  Wt(kg): --  I&O's Summary    13 Jan 2018 07:01  -  14 Jan 2018 07:00  --------------------------------------------------------  IN: 120 mL / OUT: 0 mL / NET: 120 mL        Appearance: Normal	  HEENT:   Normal oral mucosa, PERRL, EOMI	  Lymphatic: No lymphadenopathy  Cardiovascular: Normal S1 S2, No JVD, No murmurs, No edema  Respiratory: Lungs clear to auscultation	  Psychiatry: A & O x 3, Mood & affect appropriate  Gastrointestinal:  Soft, Non-tender, + BS	  Skin: No rashes, No ecchymoses, No cyanosis	  Neurologic: Non-focal  Extremities: Normal range of motion, No clubbing, cyanosis or edema  Vascular: Peripheral pulses palpable 2+ bilaterally    TELEMETRY: 	    ECG:  	  RADIOLOGY:  OTHER: 	  	  LABS:	 	    CARDIAC MARKERS:                                8.9    6.26  )-----------( 140      ( 13 Jan 2018 05:54 )             27.5     01-13    140  |  97<L>  |  19  ----------------------------<  79  4.0   |  31  |  3.91<H>    Ca    8.5      13 Jan 2018 05:54  Phos  2.7     01-13  Mg     1.8     01-13    TPro  6.3  /  Alb  3.3  /  TBili  1.2  /  DBili  x   /  AST  19  /  ALT  12  /  AlkPhos  72  01-13    proBNP:   Lipid Profile:   HgA1c:   TSH:
CHIEF COMPLAINT:Patient is a 82y old  Male who presents with a chief complaint of Anemia (13 Jan 2018 17:10)    pt feeling better      PAST MEDICAL & SURGICAL HISTORY:  Mitral valve disorder  BPH (benign prostatic hypertrophy)  Osteoarthritis: left knee.  CKD (chronic kidney disease)  Aortic dissection  Pacemaker: 10/2010, BIV ICD upgrade in 9/2015  Renal Carcinoma  Intracranial Subdural Hematoma: 1991 - no deficits  Essential Hypertension  PHT (Pulmonary Hypertension)  CHF (Congestive Heart Failure)  Aortic dissection distal to left subclavian: s/p repair 2013  CRF (chronic renal failure): AV fistula right upper arm inserted in 2016 May  Chronic kidney disease (CKD): pt s/p Right AVF, s/p Right First stage basilic vein transposition  Chronic kidney disease (CKD): s/p Right AVF 2/16, 4/16  History of cystoscopy: cryoablation  of prostate 2014  Cataract, bilateral: right 3/2015, right  5/2015  Pacemaker: 2010 [employer] model #2210, with upgrade to AICD in 2015 [employer] model #[id number]  Mitral valve replaced: Bioprosthetic - Dr. Roy 2010  S/P Craniotomy: 1991  S/P Nephrectomy: right partial nephrectomy 2006          REVIEW OF SYSTEMS:  CONSTITUTIONAL: weak  EYES: No eye pain, visual disturbances, or discharge  NECK: No pain or stiffness  RESPIRATORY: No cough, wheezing, chills or hemoptysis; No Shortness of Breath  CARDIOVASCULAR: No chest pain, palpitations, passing out, dizziness, or leg swelling  GASTROINTESTINAL: No abdominal or epigastric pain. No nausea, vomiting, or hematemesis; No diarrhea or constipation. No melena or hematochezia.  GENITOURINARY: No dysuria, frequency, hematuria, or incontinence  NEUROLOGICAL: No headaches, memory loss, loss of strength, numbness, or tremors  MUSCULOSKELETAL: No joint pain or swelling; No muscle, back, or extremity pain      Medications:  MEDICATIONS  (STANDING):  aspirin  chewable 81 milliGRAM(s) Oral daily  calcitriol   Capsule 0.25 MICROGram(s) Oral daily  epoetin shawna Injectable 3000 Unit(s) IV Push once  labetalol 200 milliGRAM(s) Oral three times a day  multivitamin 1 Tablet(s) Oral daily    MEDICATIONS  (PRN):    	    PHYSICAL EXAM:  T(C): 36.6 (01-15-18 @ 12:48), Max: 36.8 (01-14-18 @ 14:35)  HR: 60 (01-15-18 @ 12:48) (59 - 65)  BP: 132/62 (01-15-18 @ 12:48) (132/62 - 146/70)  RR: 18 (01-15-18 @ 12:48) (18 - 19)  SpO2: 100% (01-15-18 @ 12:48) (98% - 100%)  Wt(kg): --  I&O's Summary    14 Jan 2018 07:01  -  15 Turner 2018 07:00  --------------------------------------------------------  IN: 180 mL / OUT: 80 mL / NET: 100 mL        Appearance: Normal	  HEENT:   Normal oral mucosa, PERRL, EOMI	  Lymphatic: No lymphadenopathy  Cardiovascular: Normal S1 S2, No JVD, No murmurs, No edema  Respiratory: Lungs clear to auscultation	  Psychiatry: A & O x 3, Mood & affect appropriate  Gastrointestinal:  Soft, Non-tender, + BS	  Skin: No rashes, No ecchymoses, No cyanosis	  Neurologic: Non-focal  Extremities: Normal range of motion, No clubbing, cyanosis or edema  Vascular: Peripheral pulses palpable 2+ bilaterally    LABS:	 	    CARDIAC MARKERS:                                8.4    5.19  )-----------( 124      ( 15 Turner 2018 06:56 )             26.7     01-15    139  |  97<L>  |  49<H>  ----------------------------<  84  4.8   |  26  |  7.66<H>    Ca    8.1<L>      15 Turner 2018 06:56  Phos  3.9     01-15  Mg     1.9     01-15    TPro  5.9<L>  /  Alb  3.1<L>  /  TBili  0.6  /  DBili  x   /  AST  17  /  ALT  19  /  AlkPhos  91  01-15    proBNP:   Lipid Profile:   HgA1c:   TSH:
[facility] [phone]        Patient seen and examined in bed.  He was feeling better.          MEDICATIONS  (STANDING):  aspirin  chewable 81 milliGRAM(s) Oral daily  calcitriol   Capsule 0.25 MICROGram(s) Oral daily  epoetin shawna Injectable 3000 Unit(s) IV Push once  labetalol 200 milliGRAM(s) Oral three times a day  multivitamin 1 Tablet(s) Oral daily      VITAL:  T(C): , Max: 36.8 (01-15-18 @ 15:00)  T(F): , Max: 98.2 (01-15-18 @ 15:00)  HR: 59 (01-15-18 @ 15:00)  BP: 110/55 (01-15-18 @ 15:00)  BP(mean): --  RR: 18 (01-15-18 @ 15:00)  SpO2: 100% (01-15-18 @ 12:48)  Wt(kg): --    I and O's:    01-14 @ 07:01  -  01-15 @ 07:00  --------------------------------------------------------  IN: 180 mL / OUT: 80 mL / NET: 100 mL          PHYSICAL EXAM:    Constitutional: NAD  HEENT: PERRLA    Neck:  + JVD  Respiratory: CTAB/L  Cardiovascular: S1 and S2  Gastrointestinal: BS+, soft, NT/ND  Extremities: No peripheral edema  Neurological: A/O x 3, no focal deficits  Psychiatric: Normal mood, normal affect  : No Newton  Skin: No rashes  Access: avf      LABS:                        8.4    5.19  )-----------( 124      ( 15 Turner 2018 06:56 )             26.7     01-15    139  |  97<L>  |  49<H>  ----------------------------<  84  4.8   |  26  |  7.66<H>    Ca    8.1<L>      15 Turner 2018 06:56  Phos  3.9     01-15  Mg     1.9     01-15    TPro  5.9<L>  /  Alb  3.1<L>  /  TBili  0.6  /  DBili  x   /  AST  17  /  ALT  19  /  AlkPhos  91  01-15          Urine Studies:          RADIOLOGY & ADDITIONAL STUDIES:
[facility] [phone]        Patient seen and examined in the halls.  He was accompanied by his wife.  He felt much better        MEDICATIONS  (STANDING):  aspirin  chewable 81 milliGRAM(s) Oral daily  calcitriol   Capsule 0.25 MICROGram(s) Oral daily  labetalol 200 milliGRAM(s) Oral three times a day  multivitamin 1 Tablet(s) Oral daily      VITAL:  T(C): , Max: 37.3 (01-16-18 @ 05:26)  T(F): , Max: 99.1 (01-16-18 @ 05:26)  HR: 66 (01-16-18 @ 12:49)  BP: 135/66 (01-16-18 @ 12:49)  BP(mean): --  RR: 17 (01-16-18 @ 12:49)  SpO2: 99% (01-16-18 @ 12:49)  Wt(kg): --    I and O's:    01-15 @ 07:01  -  01-16 @ 07:00  --------------------------------------------------------  IN: 700 mL / OUT: 3300 mL / NET: -2600 mL          PHYSICAL EXAM:    Constitutional: NAD  HEENT: PERRLA    Neck:  No JVD  Respiratory: CTAB/L  Cardiovascular: S1 and S2  Gastrointestinal: BS+, soft, NT/ND  Extremities: No peripheral edema  Neurological: A/O x 3, no focal deficits  Psychiatric: Normal mood, normal affect  : No Newton  Skin: No rashes  Access: avf    LABS:                        9.6    5.50  )-----------( 137      ( 16 Jan 2018 06:40 )             30.2     01-16    140  |  97<L>  |  27<H>  ----------------------------<  92  4.3   |  30  |  5.44<H>    Ca    8.2<L>      16 Jan 2018 06:40  Phos  3.9     01-15  Mg     1.9     01-15    TPro  6.2  /  Alb  3.2<L>  /  TBili  0.8  /  DBili  x   /  AST  15  /  ALT  16  /  AlkPhos  94  01-16          Urine Studies:          RADIOLOGY & ADDITIONAL STUDIES:
Resting comfortably in chair.                           9.0    5.54  )-----------( 135      ( 2018 07:08 )             27.6                           9.0    5.54  )-----------( 135      ( 2018 07:08 )             27.6     14    140  |  97<L>  |  37<H>  ----------------------------<  82  4.8   |  30  |  6.12<H>    Ca    8.3<L>      2018 07:08  Phos  2.7       Mg     1.8         TPro  6.0  /  Alb  3.1<L>  /  TBili  0.8  /  DBili  x   /  AST  27  /  ALT  18  /  AlkPhos  102        I&O's Detail    2018 07:  -  2018 07:00  --------------------------------------------------------  IN:    Oral Fluid: 120 mL  Total IN: 120 mL    OUT:  Total OUT: 0 mL    Total NET: 120 mL      2018 07:  -  2018 17:30  --------------------------------------------------------  IN:    Oral Fluid: 180 mL  Total IN: 180 mL    OUT:  Total OUT: 0 mL    Total NET: 180 mL          I&O's Summary    2018 07:  -  2018 07:00  --------------------------------------------------------  IN: 120 mL / OUT: 0 mL / NET: 120 mL    2018 07 17:30  --------------------------------------------------------  IN: 180 mL / OUT: 0 mL / NET: 180 mL        Daily     Daily Weight in k.1 (2018 06:49)    Vital Signs Last 24 Hrs  T(C): 36.8 (2018 14:35), Max: 36.8 (2018 21:45)  T(F): 98.2 (2018 14:35), Max: 98.2 (2018 21:45)  HR: 65 (2018 14:35) (60 - 65)  BP: 132/65 (2018 14:35) (132/65 - 147/60)  BP(mean): --  RR: 18 (2018 14:35) (18 - 18)  SpO2: 98% (2018 14:35) (98% - 100%)      MEDICATIONS  (STANDING):  aspirin  chewable 81 milliGRAM(s) Oral daily  calcitriol   Capsule 0.25 MICROGram(s) Oral daily  labetalol 200 milliGRAM(s) Oral three times a day  multivitamin 1 Tablet(s) Oral daily    MEDICATIONS  (PRN):

## 2018-02-01 ENCOUNTER — APPOINTMENT (OUTPATIENT)
Dept: VASCULAR SURGERY | Facility: CLINIC | Age: 83
End: 2018-02-01
Payer: MEDICARE

## 2018-02-01 VITALS
SYSTOLIC BLOOD PRESSURE: 109 MMHG | BODY MASS INDEX: 18.99 KG/M2 | DIASTOLIC BLOOD PRESSURE: 61 MMHG | HEART RATE: 59 BPM | WEIGHT: 114 LBS | HEIGHT: 65 IN | TEMPERATURE: 97.8 F

## 2018-02-01 DIAGNOSIS — I71.00 DISSECTION OF UNSPECIFIED SITE OF AORTA: ICD-10-CM

## 2018-02-01 PROCEDURE — 99212 OFFICE O/P EST SF 10 MIN: CPT

## 2018-02-05 ENCOUNTER — APPOINTMENT (OUTPATIENT)
Dept: CARDIOLOGY | Facility: CLINIC | Age: 83
End: 2018-02-05
Payer: MEDICARE

## 2018-02-05 ENCOUNTER — APPOINTMENT (OUTPATIENT)
Dept: ELECTROPHYSIOLOGY | Facility: CLINIC | Age: 83
End: 2018-02-05
Payer: MEDICARE

## 2018-02-05 VITALS — HEART RATE: 60 BPM | DIASTOLIC BLOOD PRESSURE: 64 MMHG | SYSTOLIC BLOOD PRESSURE: 139 MMHG

## 2018-02-05 VITALS — BODY MASS INDEX: 18.31 KG/M2 | WEIGHT: 110 LBS

## 2018-02-05 DIAGNOSIS — I50.32 CHRONIC DIASTOLIC (CONGESTIVE) HEART FAILURE: ICD-10-CM

## 2018-02-05 DIAGNOSIS — I10 ESSENTIAL (PRIMARY) HYPERTENSION: ICD-10-CM

## 2018-02-05 DIAGNOSIS — N18.4 CHRONIC KIDNEY DISEASE, STAGE 4 (SEVERE): ICD-10-CM

## 2018-02-05 PROCEDURE — 93284 PRGRMG EVAL IMPLANTABLE DFB: CPT

## 2018-02-05 PROCEDURE — 93000 ELECTROCARDIOGRAM COMPLETE: CPT

## 2018-02-05 PROCEDURE — 99214 OFFICE O/P EST MOD 30 MIN: CPT

## 2018-02-05 RX ORDER — OXYCODONE AND ACETAMINOPHEN 7.5; 325 MG/1; MG/1
7.5-325 TABLET ORAL
Qty: 60 | Refills: 0 | Status: DISCONTINUED | COMMUNITY
Start: 2017-11-25 | End: 2018-02-05

## 2018-02-05 RX ORDER — CELECOXIB 100 MG/1
100 CAPSULE ORAL
Qty: 14 | Refills: 0 | Status: DISCONTINUED | COMMUNITY
Start: 2017-11-29 | End: 2018-02-05

## 2018-02-05 RX ORDER — ACETAMINOPHEN 325 MG/1
325 TABLET ORAL
Refills: 0 | Status: ACTIVE | COMMUNITY

## 2018-02-14 ENCOUNTER — APPOINTMENT (OUTPATIENT)
Dept: CT IMAGING | Facility: IMAGING CENTER | Age: 83
End: 2018-02-14
Payer: MEDICARE

## 2018-02-14 ENCOUNTER — OUTPATIENT (OUTPATIENT)
Dept: OUTPATIENT SERVICES | Facility: HOSPITAL | Age: 83
LOS: 1 days | End: 2018-02-14
Payer: MEDICARE

## 2018-02-14 DIAGNOSIS — H26.9 UNSPECIFIED CATARACT: Chronic | ICD-10-CM

## 2018-02-14 DIAGNOSIS — N18.9 CHRONIC KIDNEY DISEASE, UNSPECIFIED: Chronic | ICD-10-CM

## 2018-02-14 DIAGNOSIS — Z98.89 OTHER SPECIFIED POSTPROCEDURAL STATES: Chronic | ICD-10-CM

## 2018-02-14 DIAGNOSIS — I71.01 DISSECTION OF THORACIC AORTA: Chronic | ICD-10-CM

## 2018-02-14 DIAGNOSIS — I71.2 THORACIC AORTIC ANEURYSM, WITHOUT RUPTURE: ICD-10-CM

## 2018-02-14 PROCEDURE — 74174 CTA ABD&PLVS W/CONTRAST: CPT

## 2018-02-14 PROCEDURE — 71275 CT ANGIOGRAPHY CHEST: CPT

## 2018-02-14 PROCEDURE — 71275 CT ANGIOGRAPHY CHEST: CPT | Mod: 26

## 2018-02-14 PROCEDURE — 74174 CTA ABD&PLVS W/CONTRAST: CPT | Mod: 26

## 2018-02-14 PROCEDURE — 82565 ASSAY OF CREATININE: CPT

## 2018-02-15 ENCOUNTER — APPOINTMENT (OUTPATIENT)
Dept: CARDIOLOGY | Facility: CLINIC | Age: 83
End: 2018-02-15

## 2018-02-19 ENCOUNTER — INPATIENT (INPATIENT)
Facility: HOSPITAL | Age: 83
LOS: 1 days | End: 2018-02-21
Attending: INTERNAL MEDICINE | Admitting: INTERNAL MEDICINE
Payer: MEDICARE

## 2018-02-19 VITALS
SYSTOLIC BLOOD PRESSURE: 108 MMHG | OXYGEN SATURATION: 99 % | TEMPERATURE: 98 F | HEART RATE: 105 BPM | DIASTOLIC BLOOD PRESSURE: 76 MMHG | RESPIRATION RATE: 18 BRPM

## 2018-02-19 DIAGNOSIS — Z98.89 OTHER SPECIFIED POSTPROCEDURAL STATES: Chronic | ICD-10-CM

## 2018-02-19 DIAGNOSIS — N18.9 CHRONIC KIDNEY DISEASE, UNSPECIFIED: Chronic | ICD-10-CM

## 2018-02-19 DIAGNOSIS — I71.01 DISSECTION OF THORACIC AORTA: Chronic | ICD-10-CM

## 2018-02-19 DIAGNOSIS — H26.9 UNSPECIFIED CATARACT: Chronic | ICD-10-CM

## 2018-02-19 LAB
ALBUMIN SERPL ELPH-MCNC: 3.3 G/DL — SIGNIFICANT CHANGE UP (ref 3.3–5)
ALP SERPL-CCNC: 123 U/L — HIGH (ref 40–120)
ALT FLD-CCNC: 50 U/L — HIGH (ref 4–41)
ANISOCYTOSIS BLD QL: SLIGHT — SIGNIFICANT CHANGE UP
APTT BLD: 30.9 SEC — SIGNIFICANT CHANGE UP (ref 27.5–37.4)
AST SERPL-CCNC: 98 U/L — HIGH (ref 4–40)
BASE EXCESS BLDV CALC-SCNC: 4.4 MMOL/L — SIGNIFICANT CHANGE UP
BASOPHILS # BLD AUTO: 0.01 K/UL — SIGNIFICANT CHANGE UP (ref 0–0.2)
BASOPHILS NFR BLD AUTO: 0.1 % — SIGNIFICANT CHANGE UP (ref 0–2)
BASOPHILS NFR SPEC: 0 % — SIGNIFICANT CHANGE UP (ref 0–2)
BILIRUB SERPL-MCNC: 0.7 MG/DL — SIGNIFICANT CHANGE UP (ref 0.2–1.2)
BLASTS # FLD: 0 % — SIGNIFICANT CHANGE UP (ref 0–0)
BLD GP AB SCN SERPL QL: POSITIVE — SIGNIFICANT CHANGE UP
BLOOD GAS VENOUS - CREATININE: SIGNIFICANT CHANGE UP MG/DL (ref 0.5–1.3)
BUN SERPL-MCNC: 64 MG/DL — HIGH (ref 7–23)
CALCIUM SERPL-MCNC: 9.3 MG/DL — SIGNIFICANT CHANGE UP (ref 8.4–10.5)
CHLORIDE BLDV-SCNC: 98 MMOL/L — SIGNIFICANT CHANGE UP (ref 96–108)
CHLORIDE SERPL-SCNC: 94 MMOL/L — LOW (ref 98–107)
CK MB BLD-MCNC: 1.13 NG/ML — SIGNIFICANT CHANGE UP (ref 1–6.6)
CK MB BLD-MCNC: SIGNIFICANT CHANGE UP (ref 0–2.5)
CK SERPL-CCNC: 23 U/L — LOW (ref 30–200)
CO2 SERPL-SCNC: 27 MMOL/L — SIGNIFICANT CHANGE UP (ref 22–31)
CREAT SERPL-MCNC: 7.56 MG/DL — HIGH (ref 0.5–1.3)
EOSINOPHIL # BLD AUTO: 0.01 K/UL — SIGNIFICANT CHANGE UP (ref 0–0.5)
EOSINOPHIL NFR BLD AUTO: 0.1 % — SIGNIFICANT CHANGE UP (ref 0–6)
EOSINOPHIL NFR FLD: 0 % — SIGNIFICANT CHANGE UP (ref 0–6)
GAS PNL BLDV: 135 MMOL/L — LOW (ref 136–146)
GIANT PLATELETS BLD QL SMEAR: PRESENT — SIGNIFICANT CHANGE UP
GLUCOSE BLDV-MCNC: 108 — HIGH (ref 70–99)
GLUCOSE SERPL-MCNC: 113 MG/DL — HIGH (ref 70–99)
HCO3 BLDV-SCNC: 27 MMOL/L — SIGNIFICANT CHANGE UP (ref 20–27)
HCT VFR BLD CALC: 28.7 % — LOW (ref 39–50)
HCT VFR BLDV CALC: 27.8 % — LOW (ref 39–51)
HGB BLD-MCNC: 9.2 G/DL — LOW (ref 13–17)
HGB BLDV-MCNC: 8.9 G/DL — LOW (ref 13–17)
HYPOCHROMIA BLD QL: SLIGHT — SIGNIFICANT CHANGE UP
IMM GRANULOCYTES # BLD AUTO: 0.06 # — SIGNIFICANT CHANGE UP
IMM GRANULOCYTES NFR BLD AUTO: 0.6 % — SIGNIFICANT CHANGE UP (ref 0–1.5)
INR BLD: 1.41 — HIGH (ref 0.88–1.17)
LACTATE BLDV-MCNC: 3.8 MMOL/L — HIGH (ref 0.5–2)
LYMPHOCYTES # BLD AUTO: 0.81 K/UL — LOW (ref 1–3.3)
LYMPHOCYTES # BLD AUTO: 7.7 % — LOW (ref 13–44)
LYMPHOCYTES NFR SPEC AUTO: 6.9 % — LOW (ref 13–44)
MCHC RBC-ENTMCNC: 26 PG — LOW (ref 27–34)
MCHC RBC-ENTMCNC: 32.1 % — SIGNIFICANT CHANGE UP (ref 32–36)
MCV RBC AUTO: 81.1 FL — SIGNIFICANT CHANGE UP (ref 80–100)
METAMYELOCYTES # FLD: 0 % — SIGNIFICANT CHANGE UP (ref 0–1)
MICROCYTES BLD QL: SLIGHT — SIGNIFICANT CHANGE UP
MONOCYTES # BLD AUTO: 0.75 K/UL — SIGNIFICANT CHANGE UP (ref 0–0.9)
MONOCYTES NFR BLD AUTO: 7.1 % — SIGNIFICANT CHANGE UP (ref 2–14)
MONOCYTES NFR BLD: 2.6 % — SIGNIFICANT CHANGE UP (ref 2–9)
MYELOCYTES NFR BLD: 0 % — SIGNIFICANT CHANGE UP (ref 0–0)
NEUTROPHIL AB SER-ACNC: 85.3 % — HIGH (ref 43–77)
NEUTROPHILS # BLD AUTO: 8.89 K/UL — HIGH (ref 1.8–7.4)
NEUTROPHILS NFR BLD AUTO: 84.4 % — HIGH (ref 43–77)
NEUTS BAND # BLD: 1.7 % — SIGNIFICANT CHANGE UP (ref 0–6)
NRBC # FLD: 0.05 — SIGNIFICANT CHANGE UP
OTHER - HEMATOLOGY %: 0 — SIGNIFICANT CHANGE UP
OVALOCYTES BLD QL SMEAR: SLIGHT — SIGNIFICANT CHANGE UP
PCO2 BLDV: 47 MMHG — SIGNIFICANT CHANGE UP (ref 41–51)
PH BLDV: 7.41 PH — SIGNIFICANT CHANGE UP (ref 7.32–7.43)
PLATELET # BLD AUTO: 90 K/UL — LOW (ref 150–400)
PLATELET COUNT - ESTIMATE: SIGNIFICANT CHANGE UP
PMV BLD: 12.1 FL — SIGNIFICANT CHANGE UP (ref 7–13)
PO2 BLDV: 15 MMHG — LOW (ref 35–40)
POIKILOCYTOSIS BLD QL AUTO: SLIGHT — SIGNIFICANT CHANGE UP
POLYCHROMASIA BLD QL SMEAR: SLIGHT — SIGNIFICANT CHANGE UP
POTASSIUM BLDV-SCNC: 5.8 MMOL/L — HIGH (ref 3.4–4.5)
POTASSIUM SERPL-MCNC: 6.1 MMOL/L — HIGH (ref 3.5–5.3)
POTASSIUM SERPL-SCNC: 6.1 MMOL/L — HIGH (ref 3.5–5.3)
PROMYELOCYTES # FLD: 0 % — SIGNIFICANT CHANGE UP (ref 0–0)
PROT SERPL-MCNC: 7.1 G/DL — SIGNIFICANT CHANGE UP (ref 6–8.3)
PROTHROM AB SERPL-ACNC: 15.8 SEC — HIGH (ref 9.8–13.1)
RBC # BLD: 3.54 M/UL — LOW (ref 4.2–5.8)
RBC # FLD: 19.3 % — HIGH (ref 10.3–14.5)
RH IG SCN BLD-IMP: NEGATIVE — SIGNIFICANT CHANGE UP
SAO2 % BLDV: 20.4 % — LOW (ref 60–85)
SODIUM SERPL-SCNC: 140 MMOL/L — SIGNIFICANT CHANGE UP (ref 135–145)
TARGETS BLD QL SMEAR: SLIGHT — SIGNIFICANT CHANGE UP
TROPONIN T SERPL-MCNC: 0.47 NG/ML — HIGH (ref 0–0.06)
VARIANT LYMPHS # BLD: 3.5 % — SIGNIFICANT CHANGE UP
WBC # BLD: 10.53 K/UL — HIGH (ref 3.8–10.5)
WBC # FLD AUTO: 10.53 K/UL — HIGH (ref 3.8–10.5)

## 2018-02-19 PROCEDURE — 86077 PHYS BLOOD BANK SERV XMATCH: CPT

## 2018-02-19 PROCEDURE — 71045 X-RAY EXAM CHEST 1 VIEW: CPT | Mod: 26

## 2018-02-19 RX ORDER — CALCIUM GLUCONATE 100 MG/ML
1 VIAL (ML) INTRAVENOUS ONCE
Qty: 0 | Refills: 0 | Status: COMPLETED | OUTPATIENT
Start: 2018-02-19 | End: 2018-02-19

## 2018-02-19 RX ORDER — SODIUM CHLORIDE 9 MG/ML
250 INJECTION INTRAMUSCULAR; INTRAVENOUS; SUBCUTANEOUS ONCE
Qty: 0 | Refills: 0 | Status: COMPLETED | OUTPATIENT
Start: 2018-02-19 | End: 2018-02-19

## 2018-02-19 RX ORDER — ALBUTEROL 90 UG/1
10 AEROSOL, METERED ORAL ONCE
Qty: 0 | Refills: 0 | Status: COMPLETED | OUTPATIENT
Start: 2018-02-19 | End: 2018-02-19

## 2018-02-19 RX ORDER — ACETAMINOPHEN 500 MG
650 TABLET ORAL ONCE
Qty: 0 | Refills: 0 | Status: COMPLETED | OUTPATIENT
Start: 2018-02-19 | End: 2018-02-19

## 2018-02-19 RX ADMIN — Medication 200 GRAM(S): at 23:37

## 2018-02-19 RX ADMIN — SODIUM CHLORIDE 250 MILLILITER(S): 9 INJECTION INTRAMUSCULAR; INTRAVENOUS; SUBCUTANEOUS at 17:35

## 2018-02-19 RX ADMIN — Medication 200 GRAM(S): at 16:45

## 2018-02-19 RX ADMIN — ALBUTEROL 10 MILLIGRAM(S): 90 AEROSOL, METERED ORAL at 19:24

## 2018-02-19 NOTE — ED ADULT TRIAGE NOTE - CHIEF COMPLAINT QUOTE
pt BIBA from dialysis, pt had 1.5 hours of dialysis and became hypotensive.  pt offers no complaints

## 2018-02-19 NOTE — ED PROVIDER NOTE - OBJECTIVE STATEMENT
83M with PMH of Type B aortic dissection s/p repair in 2013 at [place], ESRD on HD at home M,T,Th,F, pulmonary hypertension, h/o AICD, h/o MVR in 2010 83M with PMH of Type B aortic dissection s/p repair in 2013 at [place], ESRD on HD at home M,T,Th,F, pulmonary hypertension, h/o AICD, h/o MVR in 2010 p/w  BIBA from dialysis, pt had 1.5 hours of dialysis and became hypotensive.  pt offers no complaints 83M with PMH of Type B aortic dissection s/p repair in 2013 at [place], ESRD, anemia on HD at home M,T,Th,F, pulmonary hypertension, h/o AICD, h/o MVR in 2010 p/w BIBA from dialysis with near syncope and hypotension to SBP of 70s; no definite loc but pt was confused; + total body pain and fatigue for last several days; no cp/sob/n/v/d/dysuria; gets HD AT HOME; also is on iron infusions for anemia;

## 2018-02-19 NOTE — ED PROVIDER NOTE - PSH
Aortic dissection distal to left subclavian  s/p repair 2013  Cataract, bilateral  right 3/2015, right  5/2015  Chronic kidney disease (CKD)  pt s/p Right AVF, s/p Right First stage basilic vein transposition  Chronic kidney disease (CKD)  s/p Right AVF 2/16, 4/16  CRF (chronic renal failure)  AV fistula right upper arm inserted in 2016 May  History of cystoscopy  cryoablation  of prostate 2014  Mitral valve replaced  Bioprosthetic - Dr. Roy 2010  Pacemaker  2010 [employer] model #2210, with upgrade to AICD in 2015 [employer] model #[id number]  S/P Craniotomy  1991  S/P Nephrectomy  right partial nephrectomy 2006

## 2018-02-19 NOTE — ED PROVIDER NOTE - MEDICAL DECISION MAKING DETAILS
near syncope and hypotension during hd, denies f/cough/cold/ or other infetious symptoms; no sick contacts, hypotension may be 2/2 fluid shifts and r/o anemia, re-assess near syncope and hypotension during hd, denies f/cough/cold/ or other infectious symptoms; no sick contacts, hypotension may be 2/2 fluid shifts and r/o anemia, re-assess

## 2018-02-19 NOTE — ED PROVIDER NOTE - PROGRESS NOTE DETAILS
Telemetry recording non-sustained runs of V-tach ushah: at around 6pm, it became uncovered that pt had swelling around left arm near iv site - pt denied pain and area was flushing, however there was free fluid around the vein on ultrasound. pt and family offered another ultrasound guided line but refused; pt offered a EJ line, but refused; pt offered central venous access or as a last resort and intra-osseous line but refused; pt is aware he is hypotensive, hyperkamelic and needs emergent access. pt states this has all happened before and he will be okay and will sign out of the hospital if needed- understands risk of significant morbility and/or mortality; after about 30 minutes of continued provider discussion with patient and family pt has agreed to allow an Ultrasound Attending 1 last attempt at venous access and states if not successful, he will sign out of the hospital; [facility] discussed case with attending and has asked an ultrasound attending for further assistance

## 2018-02-19 NOTE — ED PROVIDER NOTE - ATTENDING CONTRIBUTION TO CARE
Dr. Lindsey:  I have personally performed a face to face bedside history and physical examination of this patient. I have discussed the history, examination, review of systems, assessment and plan of management with the resident. I have reviewed the electronic medical record and amended it to reflect my history, review of systems, physical exam, assessment and plan.    83M h/o type B dissection s/p repair 2013, ESRD on HD at home M/T/Th/F, presents after episode of hypotension noted today (SBP 70s).  Pt endorses feeling weak.  Denies fever/chills, cp, sob, n/v/d.    Exam:  - nad Dr. Lindsey:  I have personally performed a face to face bedside history and physical examination of this patient. I have discussed the history, examination, review of systems, assessment and plan of management with the resident. I have reviewed the electronic medical record and amended it to reflect my history, review of systems, physical exam, assessment and plan.    83M h/o type B dissection s/p repair 2013, ESRD on HD at home M/T/Th/F, presents after episode of hypotension noted today (SBP 70s).  Pt endorses feeling weak.  Denies fever/chills, cp, sob, n/v/d.    Exam:  - nad, lethargic  - rrr  - ctab  - abd soft ntnd    A/P  - weakness, hypotension, eval for cardiac etiology and infectious etiology  - cbc, cmp, trop, ck, cmb  - cxr  - ekg  - telemetry

## 2018-02-19 NOTE — ED ADULT NURSE NOTE - OBJECTIVE STATEMENT
Pt presents to room 21, A&Ox3, ambulatory at baseline with assistance, coming in for evaluation of body pain and hypotension. pt has a hx of aortic dissection with repair, mitral valve replacement, ckd, renal carcinoma, esrd on dialysis m/w/f at home, htn bph, pm, chf.  pt has been c/o generalized body pain and weakness x 3 days. had a recent blood transfusion.  pt gets dialysis at home, pt had a systolic of 79 prior to the start of dialysis, was given a bolus and salt but pt still hypotensive, dialysis stopped at 1.5 hrs.

## 2018-02-20 DIAGNOSIS — Z29.9 ENCOUNTER FOR PROPHYLACTIC MEASURES, UNSPECIFIED: ICD-10-CM

## 2018-02-20 DIAGNOSIS — R55 SYNCOPE AND COLLAPSE: ICD-10-CM

## 2018-02-20 DIAGNOSIS — I05.9 RHEUMATIC MITRAL VALVE DISEASE, UNSPECIFIED: ICD-10-CM

## 2018-02-20 DIAGNOSIS — E87.5 HYPERKALEMIA: ICD-10-CM

## 2018-02-20 DIAGNOSIS — D72.829 ELEVATED WHITE BLOOD CELL COUNT, UNSPECIFIED: ICD-10-CM

## 2018-02-20 DIAGNOSIS — I10 ESSENTIAL (PRIMARY) HYPERTENSION: ICD-10-CM

## 2018-02-20 DIAGNOSIS — N18.6 END STAGE RENAL DISEASE: ICD-10-CM

## 2018-02-20 LAB
B PERT DNA SPEC QL NAA+PROBE: SIGNIFICANT CHANGE UP
BASOPHILS # BLD AUTO: 0.02 K/UL — SIGNIFICANT CHANGE UP (ref 0–0.2)
BASOPHILS NFR BLD AUTO: 0.3 % — SIGNIFICANT CHANGE UP (ref 0–2)
BUN SERPL-MCNC: 39 MG/DL — HIGH (ref 7–23)
BUN SERPL-MCNC: 71 MG/DL — HIGH (ref 7–23)
BUN SERPL-MCNC: 76 MG/DL — HIGH (ref 7–23)
C PNEUM DNA SPEC QL NAA+PROBE: NOT DETECTED — SIGNIFICANT CHANGE UP
CALCIUM SERPL-MCNC: 8.7 MG/DL — SIGNIFICANT CHANGE UP (ref 8.4–10.5)
CALCIUM SERPL-MCNC: 9 MG/DL — SIGNIFICANT CHANGE UP (ref 8.4–10.5)
CALCIUM SERPL-MCNC: 9.4 MG/DL — SIGNIFICANT CHANGE UP (ref 8.4–10.5)
CHLORIDE SERPL-SCNC: 93 MMOL/L — LOW (ref 98–107)
CHLORIDE SERPL-SCNC: 94 MMOL/L — LOW (ref 98–107)
CHLORIDE SERPL-SCNC: 96 MMOL/L — LOW (ref 98–107)
CHOLEST SERPL-MCNC: 102 MG/DL — LOW (ref 120–199)
CK MB BLD-MCNC: 1.05 NG/ML — SIGNIFICANT CHANGE UP (ref 1–6.6)
CK MB BLD-MCNC: 1.15 NG/ML — SIGNIFICANT CHANGE UP (ref 1–6.6)
CK SERPL-CCNC: 24 U/L — LOW (ref 30–200)
CK SERPL-CCNC: 25 U/L — LOW (ref 30–200)
CO2 SERPL-SCNC: 27 MMOL/L — SIGNIFICANT CHANGE UP (ref 22–31)
CO2 SERPL-SCNC: 27 MMOL/L — SIGNIFICANT CHANGE UP (ref 22–31)
CO2 SERPL-SCNC: 31 MMOL/L — SIGNIFICANT CHANGE UP (ref 22–31)
CREAT SERPL-MCNC: 4.83 MG/DL — HIGH (ref 0.5–1.3)
CREAT SERPL-MCNC: 8.04 MG/DL — HIGH (ref 0.5–1.3)
CREAT SERPL-MCNC: 8.43 MG/DL — HIGH (ref 0.5–1.3)
EOSINOPHIL # BLD AUTO: 0.01 K/UL — SIGNIFICANT CHANGE UP (ref 0–0.5)
EOSINOPHIL NFR BLD AUTO: 0.1 % — SIGNIFICANT CHANGE UP (ref 0–6)
FLUAV H1 2009 PAND RNA SPEC QL NAA+PROBE: NOT DETECTED — SIGNIFICANT CHANGE UP
FLUAV H1 RNA SPEC QL NAA+PROBE: NOT DETECTED — SIGNIFICANT CHANGE UP
FLUAV H3 RNA SPEC QL NAA+PROBE: NOT DETECTED — SIGNIFICANT CHANGE UP
FLUAV SUBTYP SPEC NAA+PROBE: SIGNIFICANT CHANGE UP
FLUBV RNA SPEC QL NAA+PROBE: NOT DETECTED — SIGNIFICANT CHANGE UP
GLUCOSE SERPL-MCNC: 103 MG/DL — HIGH (ref 70–99)
GLUCOSE SERPL-MCNC: 98 MG/DL — SIGNIFICANT CHANGE UP (ref 70–99)
GLUCOSE SERPL-MCNC: 99 MG/DL — SIGNIFICANT CHANGE UP (ref 70–99)
HADV DNA SPEC QL NAA+PROBE: NOT DETECTED — SIGNIFICANT CHANGE UP
HBA1C BLD-MCNC: 5.4 % — SIGNIFICANT CHANGE UP (ref 4–5.6)
HCOV 229E RNA SPEC QL NAA+PROBE: NOT DETECTED — SIGNIFICANT CHANGE UP
HCOV HKU1 RNA SPEC QL NAA+PROBE: NOT DETECTED — SIGNIFICANT CHANGE UP
HCOV NL63 RNA SPEC QL NAA+PROBE: NOT DETECTED — SIGNIFICANT CHANGE UP
HCOV OC43 RNA SPEC QL NAA+PROBE: NOT DETECTED — SIGNIFICANT CHANGE UP
HCT VFR BLD CALC: 27.3 % — LOW (ref 39–50)
HDLC SERPL-MCNC: 22 MG/DL — LOW (ref 35–55)
HGB BLD-MCNC: 8.8 G/DL — LOW (ref 13–17)
HMPV RNA SPEC QL NAA+PROBE: NOT DETECTED — SIGNIFICANT CHANGE UP
HPIV1 RNA SPEC QL NAA+PROBE: NOT DETECTED — SIGNIFICANT CHANGE UP
HPIV2 RNA SPEC QL NAA+PROBE: NOT DETECTED — SIGNIFICANT CHANGE UP
HPIV3 RNA SPEC QL NAA+PROBE: NOT DETECTED — SIGNIFICANT CHANGE UP
HPIV4 RNA SPEC QL NAA+PROBE: NOT DETECTED — SIGNIFICANT CHANGE UP
IMM GRANULOCYTES # BLD AUTO: 0.04 # — SIGNIFICANT CHANGE UP
IMM GRANULOCYTES NFR BLD AUTO: 0.5 % — SIGNIFICANT CHANGE UP (ref 0–1.5)
LIPID PNL WITH DIRECT LDL SERPL: 44 MG/DL — SIGNIFICANT CHANGE UP
LYMPHOCYTES # BLD AUTO: 0.9 K/UL — LOW (ref 1–3.3)
LYMPHOCYTES # BLD AUTO: 12 % — LOW (ref 13–44)
M PNEUMO DNA SPEC QL NAA+PROBE: NOT DETECTED — SIGNIFICANT CHANGE UP
MAGNESIUM SERPL-MCNC: 2 MG/DL — SIGNIFICANT CHANGE UP (ref 1.6–2.6)
MCHC RBC-ENTMCNC: 25.8 PG — LOW (ref 27–34)
MCHC RBC-ENTMCNC: 32.2 % — SIGNIFICANT CHANGE UP (ref 32–36)
MCV RBC AUTO: 80.1 FL — SIGNIFICANT CHANGE UP (ref 80–100)
METHOD TYPE: SIGNIFICANT CHANGE UP
MONOCYTES # BLD AUTO: 0.69 K/UL — SIGNIFICANT CHANGE UP (ref 0–0.9)
MONOCYTES NFR BLD AUTO: 9.2 % — SIGNIFICANT CHANGE UP (ref 2–14)
NEUTROPHILS # BLD AUTO: 5.84 K/UL — SIGNIFICANT CHANGE UP (ref 1.8–7.4)
NEUTROPHILS NFR BLD AUTO: 77.9 % — HIGH (ref 43–77)
NRBC # FLD: 0.05 — SIGNIFICANT CHANGE UP
ORGANISM # SPEC MICROSCOPIC CNT: SIGNIFICANT CHANGE UP
ORGANISM # SPEC MICROSCOPIC CNT: SIGNIFICANT CHANGE UP
PHOSPHATE SERPL-MCNC: 4.7 MG/DL — HIGH (ref 2.5–4.5)
PLATELET # BLD AUTO: 83 K/UL — LOW (ref 150–400)
PMV BLD: 11.5 FL — SIGNIFICANT CHANGE UP (ref 7–13)
POTASSIUM SERPL-MCNC: 4.4 MMOL/L — SIGNIFICANT CHANGE UP (ref 3.5–5.3)
POTASSIUM SERPL-MCNC: 6.2 MMOL/L — CRITICAL HIGH (ref 3.5–5.3)
POTASSIUM SERPL-MCNC: 6.4 MMOL/L — CRITICAL HIGH (ref 3.5–5.3)
POTASSIUM SERPL-SCNC: 4.4 MMOL/L — SIGNIFICANT CHANGE UP (ref 3.5–5.3)
POTASSIUM SERPL-SCNC: 6.2 MMOL/L — CRITICAL HIGH (ref 3.5–5.3)
POTASSIUM SERPL-SCNC: 6.4 MMOL/L — CRITICAL HIGH (ref 3.5–5.3)
RBC # BLD: 3.41 M/UL — LOW (ref 4.2–5.8)
RBC # FLD: 19.7 % — HIGH (ref 10.3–14.5)
RSV RNA SPEC QL NAA+PROBE: NOT DETECTED — SIGNIFICANT CHANGE UP
RV+EV RNA SPEC QL NAA+PROBE: NOT DETECTED — SIGNIFICANT CHANGE UP
SODIUM SERPL-SCNC: 138 MMOL/L — SIGNIFICANT CHANGE UP (ref 135–145)
SODIUM SERPL-SCNC: 141 MMOL/L — SIGNIFICANT CHANGE UP (ref 135–145)
SODIUM SERPL-SCNC: 141 MMOL/L — SIGNIFICANT CHANGE UP (ref 135–145)
SPECIMEN SOURCE: SIGNIFICANT CHANGE UP
SPECIMEN SOURCE: SIGNIFICANT CHANGE UP
TRIGL SERPL-MCNC: 127 MG/DL — SIGNIFICANT CHANGE UP (ref 10–149)
TROPONIN T SERPL-MCNC: 0.45 NG/ML — HIGH (ref 0–0.06)
TROPONIN T SERPL-MCNC: 0.46 NG/ML — HIGH (ref 0–0.06)
TSH SERPL-MCNC: 6.09 UIU/ML — HIGH (ref 0.27–4.2)
WBC # BLD: 7.5 K/UL — SIGNIFICANT CHANGE UP (ref 3.8–10.5)
WBC # FLD AUTO: 7.5 K/UL — SIGNIFICANT CHANGE UP (ref 3.8–10.5)

## 2018-02-20 PROCEDURE — 99222 1ST HOSP IP/OBS MODERATE 55: CPT

## 2018-02-20 RX ORDER — INSULIN HUMAN 100 [IU]/ML
10 INJECTION, SOLUTION SUBCUTANEOUS ONCE
Qty: 0 | Refills: 0 | Status: DISCONTINUED | OUTPATIENT
Start: 2018-02-20 | End: 2018-02-20

## 2018-02-20 RX ORDER — DEXTROSE 50 % IN WATER 50 %
50 SYRINGE (ML) INTRAVENOUS ONCE
Qty: 0 | Refills: 0 | Status: DISCONTINUED | OUTPATIENT
Start: 2018-02-20 | End: 2018-02-20

## 2018-02-20 RX ORDER — CALCIUM GLUCONATE 100 MG/ML
1 VIAL (ML) INTRAVENOUS ONCE
Qty: 0 | Refills: 0 | Status: DISCONTINUED | OUTPATIENT
Start: 2018-02-20 | End: 2018-02-20

## 2018-02-20 RX ORDER — HEPARIN SODIUM 5000 [USP'U]/ML
5000 INJECTION INTRAVENOUS; SUBCUTANEOUS EVERY 8 HOURS
Qty: 0 | Refills: 0 | Status: DISCONTINUED | OUTPATIENT
Start: 2018-02-20 | End: 2018-02-20

## 2018-02-20 RX ORDER — IRON SUCROSE 20 MG/ML
100 INJECTION, SOLUTION INTRAVENOUS
Qty: 0 | Refills: 0 | Status: DISCONTINUED | OUTPATIENT
Start: 2018-02-20 | End: 2018-02-21

## 2018-02-20 RX ORDER — ERYTHROPOIETIN 10000 [IU]/ML
4000 INJECTION, SOLUTION INTRAVENOUS; SUBCUTANEOUS
Qty: 0 | Refills: 0 | Status: DISCONTINUED | OUTPATIENT
Start: 2018-02-20 | End: 2018-02-21

## 2018-02-20 RX ORDER — SODIUM POLYSTYRENE SULFONATE 4.1 MEQ/G
30 POWDER, FOR SUSPENSION ORAL ONCE
Qty: 0 | Refills: 0 | Status: COMPLETED | OUTPATIENT
Start: 2018-02-20 | End: 2018-02-20

## 2018-02-20 RX ORDER — VANCOMYCIN HCL 1 G
750 VIAL (EA) INTRAVENOUS ONCE
Qty: 0 | Refills: 0 | Status: COMPLETED | OUTPATIENT
Start: 2018-02-20 | End: 2018-02-20

## 2018-02-20 RX ORDER — CALCITRIOL 0.5 UG/1
0.25 CAPSULE ORAL DAILY
Qty: 0 | Refills: 0 | Status: DISCONTINUED | OUTPATIENT
Start: 2018-02-20 | End: 2018-02-21

## 2018-02-20 RX ORDER — PIPERACILLIN AND TAZOBACTAM 4; .5 G/20ML; G/20ML
3.38 INJECTION, POWDER, LYOPHILIZED, FOR SOLUTION INTRAVENOUS ONCE
Qty: 0 | Refills: 0 | Status: COMPLETED | OUTPATIENT
Start: 2018-02-20 | End: 2018-02-20

## 2018-02-20 RX ORDER — PIPERACILLIN AND TAZOBACTAM 4; .5 G/20ML; G/20ML
3.38 INJECTION, POWDER, LYOPHILIZED, FOR SOLUTION INTRAVENOUS EVERY 8 HOURS
Qty: 0 | Refills: 0 | Status: DISCONTINUED | OUTPATIENT
Start: 2018-02-20 | End: 2018-02-20

## 2018-02-20 RX ORDER — PIPERACILLIN AND TAZOBACTAM 4; .5 G/20ML; G/20ML
3.38 INJECTION, POWDER, LYOPHILIZED, FOR SOLUTION INTRAVENOUS EVERY 12 HOURS
Qty: 0 | Refills: 0 | Status: DISCONTINUED | OUTPATIENT
Start: 2018-02-20 | End: 2018-02-21

## 2018-02-20 RX ORDER — LABETALOL HCL 100 MG
200 TABLET ORAL THREE TIMES A DAY
Qty: 0 | Refills: 0 | Status: DISCONTINUED | OUTPATIENT
Start: 2018-02-20 | End: 2018-02-21

## 2018-02-20 RX ORDER — ALBUTEROL 90 UG/1
10 AEROSOL, METERED ORAL ONCE
Qty: 0 | Refills: 0 | Status: DISCONTINUED | OUTPATIENT
Start: 2018-02-20 | End: 2018-02-20

## 2018-02-20 RX ORDER — ASPIRIN/CALCIUM CARB/MAGNESIUM 324 MG
81 TABLET ORAL DAILY
Qty: 0 | Refills: 0 | Status: DISCONTINUED | OUTPATIENT
Start: 2018-02-20 | End: 2018-02-21

## 2018-02-20 RX ADMIN — Medication 81 MILLIGRAM(S): at 14:45

## 2018-02-20 RX ADMIN — SODIUM POLYSTYRENE SULFONATE 30 GRAM(S): 4.1 POWDER, FOR SUSPENSION ORAL at 00:42

## 2018-02-20 RX ADMIN — CALCITRIOL 0.25 MICROGRAM(S): 0.5 CAPSULE ORAL at 14:45

## 2018-02-20 RX ADMIN — Medication 1 TABLET(S): at 14:45

## 2018-02-20 RX ADMIN — ERYTHROPOIETIN 4000 UNIT(S): 10000 INJECTION, SOLUTION INTRAVENOUS; SUBCUTANEOUS at 12:10

## 2018-02-20 RX ADMIN — PIPERACILLIN AND TAZOBACTAM 200 GRAM(S): 4; .5 INJECTION, POWDER, LYOPHILIZED, FOR SOLUTION INTRAVENOUS at 22:38

## 2018-02-20 RX ADMIN — Medication 150 MILLIGRAM(S): at 23:26

## 2018-02-20 RX ADMIN — IRON SUCROSE 210 MILLIGRAM(S): 20 INJECTION, SOLUTION INTRAVENOUS at 12:11

## 2018-02-20 RX ADMIN — HEPARIN SODIUM 5000 UNIT(S): 5000 INJECTION INTRAVENOUS; SUBCUTANEOUS at 06:19

## 2018-02-20 RX ADMIN — Medication 200 MILLIGRAM(S): at 14:50

## 2018-02-20 NOTE — CHART NOTE - NSCHARTNOTEFT_GEN_A_CORE
Lab notified that patient grew gram positive cocci in clusters in aerobic bottles. Discussed with Dr. Arora. Will dose vancomycin x1 for now(given patietn with ESRD on HD), and zosyn. ID consult in AM.  Vancomycin level ordered for AM. Lab notified that patient grew gram positive cocci in clusters in aerobic bottles. Discussed with Dr. Arora. Will dose vancomycin x1 for now(given patietn with ESRD on HD), and zosyn. ID consult in AM.  Repeat blood cultures ordered prior to starting abx, Vancomycin level ordered for AM.

## 2018-02-20 NOTE — H&P ADULT - PROBLEM SELECTOR PLAN 3
Continue antihypertensives  Monitor BP serially  Sodium restriction. WBC 10.53 Neutrophils 84.4  Lactate 3.8  RVP ordered  Blood cultures x 2 ordered

## 2018-02-20 NOTE — H&P ADULT - PROBLEM SELECTOR PLAN 2
Monitor on telemetry for continuos cardiac monitoring  Serial EKGs and trend cardiac enzymes  Fall and seizure precautions  Orthostatics ordered

## 2018-02-20 NOTE — H&P ADULT - NSHPPHYSICALEXAM_GEN_ALL_CORE
Appearance: Normal, NAD, NRD.  HEENT:  Normal oral mucosa, PERRL, EOMI	  Cardiovascular: Normal S1 S2, No JVD, No murmurs, No edema  Respiratory: Lungs clear to auscultation, +RLL crackles; no rales/rhonchi/wheezing	  Psychiatry: A & O x 2-3, to person and place, unsure of time, Mood & affect appropriate  Gastrointestinal:  Soft, Non-tender, ND + BS	  Skin: No rashes, No ecchymoses, No cyanosis, liver spots, RUE AVF  Neurologic: Non-focal, sensation intact,  strength 5/5 B/L  Extremities: Normal range of motion, No clubbing, cyanosis or edema  Vascular: Peripheral pulses palpable

## 2018-02-20 NOTE — H&P ADULT - PROBLEM SELECTOR PLAN 1
K resulted 6.1 not hemolyzed s/p Kayexalate, Calcium gluconate   Admit to telemetry for continuous cardiac monitoring.  HD per Renal - will c/s Dr. Romano in AM  Low potassium diet.  Repeat BMP post BM  Avoid ACEI/ARB and K sparing diuretics K resulted 6.1 not hemolyzed s/p Kayexalate, Calcium gluconate   Admit to telemetry for continuous cardiac monitoring.  HD per Renal - will c/s Dr. Romano in AM  Low potassium diet.  Repeat BMP post BM  Avoid ACEI/ARB and K sparing diuretics/nephrotoxic medications

## 2018-02-20 NOTE — PATIENT PROFILE ADULT. - PT NEEDS ASSIST
ESTABLISHED PATIENT PRE-VISIT PLANNING     Note: Patient will not be contacted if there is no indication to call.     1.  Reviewed notes from the last few office visits within the medical group: Yes    2.  If any orders were placed at last visit or intended to be done for this visit (i.e. 6 mos follow-up), do we have Results/Consult Notes?        •  Labs - Labs ordered, completed and results are in chart.       •  Imaging - Imaging ordered, completed and results are in chart.       •  Referrals - No referrals were ordered at last office visit.    3. Is this appointment scheduled as a Hospital Follow-Up? No    4.  Immunizations were updated in [facility] using WebIZ?: Yes       •  Web Iz Recommendations: FLU, HEPATITIS A , HEPATITIS B and TDAP    5.  Patient is due for the following Health Maintenance Topics:   Health Maintenance Due   Topic Date Due   • Annual Wellness Visit  1939   • PAP SMEAR  06/27/1962   • COLONOSCOPY  06/27/1991   • IMM DTaP/Tdap/Td Vaccine (1 - Tdap) 08/07/2010   • MAMMOGRAM  08/26/2014   • BONE DENSITY  12/03/2015   • IMM INFLUENZA (1) 09/01/2017       - Patient has completed HEPATITIS A , HEPATITIS B, PNEUMOVAX (PPSV23), PREVNAR (PCV13) , TD and ZOSTAVAX (Shingles) Immunization(s) per WebIZ. Chart has been updated.      6.  Patient was NOT informed to arrive 15 min prior to their scheduled appointment and bring in their medication bottles.   yes

## 2018-02-20 NOTE — H&P ADULT - HISTORY OF PRESENT ILLNESS
84 y/o Male with PMHx of Type B aortic dissection s/p repair in 2013 at [place], ESRD, anemia on HD at home M,T,Th,F, pulmonary hypertension, h/o AICD, h/o MVR in 2010 p/w BIBA from dialysis with near syncope and hypotension to SBP of 70s per ED note with confusion post event. States he does not recall events leading prior to event. Endorses total body pain and fatigue for last several days. Admits daughter is sick with questionable cold. States he is on iron infusions for anemia. Recently admitted on 1/13-1/16/18 was found to have a hemoglobin of 7.8 received 3 unit of PRBC, Echocardiogram performed and compared with echocardiogram of 3/3/2014, the aortic regurgitation is now moderate to severe and severe pulmonary hypertension. 02/14/18 CT Chest/Abdomen/Pelvis: Status post thoracic-type B aortic dissection repair with residual dissection extending from the diaphragmatic hiatus into the common iliac arteries. Dilatation proximal and distal abdominal aorta and proximal right common iliac artery. Thrombosis distal false lumen with decreased perfusion left external iliac artery unchanged. New right upper lobe opacity which may be infectious. Signs of right-sided congestive heart failure.     In ED patient was given 250cc NS bolus, K found to be 6.1 not hemolyzed, given IV Calcium gluconate, Kayexalate. Currently asymptomatic, resting comfortably.

## 2018-02-20 NOTE — CONSULT NOTE ADULT - ASSESSMENT
83M with ESRD on HD, CAD, MR s/p MVR, PVD, +ICD, hx of type B aortic dissection s/p repair sent in from HD with weakness  -pt without cp, sob.   -not grossly volume overloaded on exam, does not appear decompensated from HF standpoint  -new lethargy concerning, would consider complete infectious w/u, no murmur auscultated on exam   -w/u per primary team.   -cont BB per home regimen    Akshat Montalvo MD [id number]

## 2018-02-20 NOTE — ED ADULT NURSE REASSESSMENT NOTE - NS ED NURSE REASSESS COMMENT FT1
pt vs as charted. pt appears in NAD. no cp/sob, dizziness/lightheadedness, n/v, at this time. paced rhythm on cardiac monitor. will continue to monitor.

## 2018-02-20 NOTE — ED ADULT NURSE REASSESSMENT NOTE - NS ED NURSE REASSESS COMMENT FT1
pt vs as charted. pt in NAD. paced rhythm on cardiac monitor. awaiting bed. will continue to monitor.

## 2018-02-20 NOTE — H&P ADULT - NSHPLABSRESULTS_GEN_ALL_CORE
01/2018 TTE: EF 76% Bioprosthetic mitral valve replacement. Mild-moderate mitral regurgitation. Mean transmitral valve gradient equals 7 mm Hg, which is elevated even in the setting of a prosthetic valve. Calcified trileaflet aortic valve with normal opening. Moderate-severe aortic regurgitation. Severely dilated left atrium.  LA volume index = 49  cc/m2. Moderate concentric left ventricular hypertrophy. Normal left ventricular systolic function. No segmental wall motion abnormalities. Normal right atrium. A device wire is noted in the right heart. Normal right ventricular size with decreased right ventricular systolic function. Normal tricuspid valve.  Severe tricuspid regurgitation. Estimated pulmonary artery systolic pressure equals 76mm Hg, assuming right atrial pressure equals 10  mm Hg,consistent with severe pulmonary hypertension.*** Compared with echocardiogram of 3/3/2014, the aortic regurgitation is now moderate to severe. There is severe pulmonary hypertension.  02/14/18 CT Chest/Abdomen/Pelvis: Status post thoracic-type B aortic dissection repair with residual dissection extending from the diaphragmatic hiatus into the common iliac arteries. Dilatation proximal and distal abdominal aorta and proximal right common iliac artery. Thrombosis distal false lumen with decreased perfusion left external iliac artery unchanged. New right upper lobe opacity which may be infectious. Signs of right-sided congestive heart failure.  CE CK 23 Trop 0.47  EKG: A paced 60bpm TWI V4-V6

## 2018-02-20 NOTE — H&P ADULT - NSHPREVIEWOFSYSTEMS_GEN_ALL_CORE
Constitutional: No fever; no chills; fatigue or weight loss/gain; no diaphoresis  Skin: No rash, itching, ulcerations, dryness, pressure ulcer.  Eyes: no diplopia; no photophobia; no blurred vision   ENT: no hearing difficulty; no ear pain; no tinnitus; no vertigo; no nasal congestion; no nasal discharge; no nose bleeds no sore throat.  Endocrine: No thyroid problems.  Cardiovascular: No chest pain; no palpitations; no dyspnea on exertion; no PND; no orthopnea;   Respiratory: no shortness of breath; no wheezing; no dyspnea; no cough.  Gastrointestinal: No abdominal pain; no nausea; vomiting; or diarrhea.  Genitourinary: No dysuria.  Musculoskeletal: no arthralgia; no arthritis; no joint swelling; no muscle weakness  Neurologic: no transient paralysis; no weakness; no paresthesias; no difficulty walking; no headache;  syncope

## 2018-02-20 NOTE — H&P ADULT - NSHPSOCIALHISTORY_GEN_ALL_CORE
Social History:  · Marital Status	  · Occupation	Retired   · Lives With	Wife, daughter, granddaughter    Substance Use History:  · Substance Use	never used    Alcohol Use History:  · Have you ever consumed alcohol	yes...denies current alcohol use   Tobacco Usage:  · Tobacco Usage: Former smoker  · Tobacco Type: cigarettes  · Last Tobacco Use: 1992

## 2018-02-20 NOTE — H&P ADULT - PSH
Aortic dissection distal to left subclavian  s/p repair 2013  Cataract, bilateral  right 3/2015, right  5/2015  Chronic kidney disease (CKD)  pt s/p Right AVF, s/p Right First stage basilic vein transposition  Chronic kidney disease (CKD)  s/p Right AVF 2/16, 4/16  CRF (chronic renal failure)  AV fistula right upper arm inserted in 2016 May  History of cystoscopy  cryoablation  of prostate 2014  Mitral valve replaced  Bioprosthetic - Dr. Roy 2010  Pacemaker  2010 [employer] model #2210, with upgrade to AICD in 2015 [employer] model #AT2477  S/P Craniotomy  1991  S/P Nephrectomy  right partial nephrectomy 2006

## 2018-02-20 NOTE — CHART NOTE - NSCHARTNOTEFT_GEN_A_CORE
Called house cardiology for gen cards consultation for syncope/hypotension at dialysis.  Will await consultation .

## 2018-02-20 NOTE — PROGRESS NOTE ADULT - SUBJECTIVE AND OBJECTIVE BOX
Patient seen and evaluated on dialysis; tolerating well  Dialyze for 3 hrs on 1 K bath, no UF, give up to 1 liter NS if needed for hemodynamics support ; patient presented with soft BP and was hypotensive during dialysis at home.  /64 at present and patient has no complaints  Will continue to monitor

## 2018-02-21 ENCOUNTER — APPOINTMENT (OUTPATIENT)
Age: 83
End: 2018-02-21

## 2018-02-21 LAB
GLUCOSE BLDC GLUCOMTR-MCNC: 81 MG/DL — SIGNIFICANT CHANGE UP (ref 70–99)
ORGANISM # SPEC MICROSCOPIC CNT: SIGNIFICANT CHANGE UP
SPECIMEN SOURCE: SIGNIFICANT CHANGE UP
SPECIMEN SOURCE: SIGNIFICANT CHANGE UP

## 2018-02-21 RX ORDER — HEPARIN SODIUM 5000 [USP'U]/ML
5000 INJECTION INTRAVENOUS; SUBCUTANEOUS EVERY 8 HOURS
Qty: 0 | Refills: 0 | Status: DISCONTINUED | OUTPATIENT
Start: 2018-02-21 | End: 2018-02-21

## 2018-02-21 RX ORDER — NOREPINEPHRINE BITARTRATE/D5W 8 MG/250ML
0.01 PLASTIC BAG, INJECTION (ML) INTRAVENOUS
Qty: 8 | Refills: 0 | Status: DISCONTINUED | OUTPATIENT
Start: 2018-02-21 | End: 2018-02-21

## 2018-02-21 RX ORDER — PROPOFOL 10 MG/ML
5 INJECTION, EMULSION INTRAVENOUS
Qty: 1000 | Refills: 0 | Status: DISCONTINUED | OUTPATIENT
Start: 2018-02-21 | End: 2018-02-21

## 2018-02-21 RX ORDER — CHLORHEXIDINE GLUCONATE 213 G/1000ML
1 SOLUTION TOPICAL DAILY
Qty: 0 | Refills: 0 | Status: DISCONTINUED | OUTPATIENT
Start: 2018-02-21 | End: 2018-02-21

## 2018-02-21 RX ORDER — CHLORHEXIDINE GLUCONATE 213 G/1000ML
15 SOLUTION TOPICAL
Qty: 0 | Refills: 0 | Status: DISCONTINUED | OUTPATIENT
Start: 2018-02-21 | End: 2018-02-21

## 2018-02-21 NOTE — DISCHARGE NOTE FOR THE EXPIRED PATIENT - SECONDARY DIAGNOSIS.
ESRD (end stage renal disease) on dialysis Hyperkalemia Essential Hypertension Aortic dissection distal to left subclavian

## 2018-02-21 NOTE — CHART NOTE - NSCHARTNOTEFT_GEN_A_CORE
Called to evaluate the patient for unresponsiveness.  No pulse was felt chest compressions started and 1 round of epinephrine given.  Family at bedside wishes to stop compressions and make patient DNR.  Compressions stopped and pulse check revealed no pulse.  On physical exam patient did not respond to verbal or physical stimuli. No spontaneous respirations.  Absent heart and breath sounds. Absent radial, femoral, and carotid pulses.   Pupils are fixed and dilated absent DARLIN, no corneal reflex.  Bedside US reveals no cardiac movement.  EKG strip reads asystole.  Patient pronounced dead at 453 on 2/21/18. Attending notified.  Family at bedside decline autopsy.    Monie Carlton PA-C  [facility] [id number]

## 2018-02-21 NOTE — PROGRESS NOTE ADULT - ASSESSMENT
84 y/o Male with PMHx of Type B aortic dissection s/p repair in 2013 at [place], ESRD, anemia on HD at home M,T,Th,F, pulmonary hypertension, h/o AICD, h/o MVR in 2010 p/w BIBA from dialysis with near syncope and hypotension to SBP of 70s per ED note with confusion post event. Admitted to telemetry for syncope and hyperkalemia. Now s/p intubation after code; transferred to the MICU.    #Neuro: intubated, sedated, orthostatics neg  -will wean off sedation  -maintain fall and seizure precautions    #CV:  a) CAD, MR, PVD, pulm HTN  -TTE in Jan showing worsening AR and pulm HTN  -c/w [id number] for now  -hold BB for hypotension    b) HTN  -holding hypertensive meds as pt sedated, on levophed gtt    #Pulm: Intubated, sedated  -f/u ABG  -will wean off sedation, monitor    #GI:  -NPO  -will insert OGT for feeds in AM    #/Renal:  a) ESRD: on HD  -HD as per nephrology  -c/w calcitriol, epo  b) Hyperkalemia  -originally K 6.1, s/p Kayexalate, calcium, gluconate, HD -now normalized  -continue to monitor BMP  -low potassium diet  -avoid ACEI/ARB    #Endo/Rheum: no active issues  -continue to monitor    #Heme/onc:  a) leukocytosis on admission, BCX + for coag neg Staph and proteus  -c/w zosyn  -monitor CBC  b) normocytic anemia  -at baseline; c/w venofer, and monitoring    #ID: BCX + for coag neg Staph and proteus  -c/w zosyn  -f/u sensitivities  -trend CBC    #DVT ppx:  -SQH    #Dispo:  -MICU  -full code as per family    Aye De Leonuster, PGY2  MICU  Pager: [phone]

## 2018-02-21 NOTE — DISCHARGE NOTE FOR THE EXPIRED PATIENT - HOSPITAL COURSE
84 y/o Male with PMHx of Type B aortic dissection s/p repair in 2013 at [place], ESRD, anemia on HD at home M,T,Th,F, pulmonary hypertension, h/o AICD, h/o MVR in 2010 p/w BIBA from dialysis with near syncope and hypotension to SBP of 70s per ED note with confusion post event. Admitted to telemetry for syncope and hyperkalemia. Patient had HD session 2/20.  Patient was growing gram positive cocci in clusters in aerobic bottles was started on vanco and zosyn.  On 2/21 early am patient was trying to get up out of bed saying he needed to use the bathroom and then became agonal and fell back in bed as per PCA.  Rapid response was called and then switched to code blue as patient had no pulse.  Chest compressions were started and patient received epinephrine x1 as he was found to be in PEA then had ROSC.  Patient subsequently lost his pulse again and chest compressions were restarted and patient received epinephrine x1, sodium bicarbonate x1, and calcium gluconate x1 and patient had ROCS.  Patient was intubated by anesthesia and started on levoped for BP support and was transferred to MICU.  In MICU patient subsequently lost pulse again and chest compressions were initiated.  Family at bedside asked to stop compressions and patient was made DNR.  Patient pronounced dead at 0453 on 2/21/18.  Family does not want autopsy.

## 2018-02-21 NOTE — PROGRESS NOTE ADULT - SUBJECTIVE AND OBJECTIVE BOX
MICU ACCEPT NOTE    CC: Patient is a 83y old  Male who presents with a chief complaint of pt states" I can't walk and I have pain all over the body since 3 days ago." (20 Feb 2018 20:45)    Providers:  Dr. Meliton Romano - Renal  Dr. Maury Marcelino- PMD  Dr. Sigifredo Cruz- Cardiology    83M with ESRD on HD (M,T,Th,F), CAD, MR s/p MVR (2010), PVD, +ICD, hx of type B aortic dissection s/p repair (2013), pulm HTN sent in from HD with pre-syncope and hypotension (SBP 70s). Recently admitted on 1/13-1/16/18 was found to have a hemoglobin of 7.8 received 3 unit of PRBC. TTE performed and compared with echocardiogram of 3/3/2014: the aortic regurgitation is now moderate to severe and severe pulmonary hypertension. 02/14/18 CT Chest/Abdomen/Pelvis: Status post thoracic-type B aortic dissection repair with residual dissection extending from the diaphragmatic hiatus into the common iliac arteries. Dilatation proximal and distal abdominal aorta and proximal right common iliac artery. Thrombosis distal false lumen with decreased perfusion left external iliac artery unchanged. New right upper lobe opacity which may be infectious. Signs of right-sided congestive heart failure.     In ED patient was given 250cc NS bolus, K found to be 6.1 not hemolyzed, given IV Calcium gluconate, Kayexalate. Admitted to medicine w/cardiology and nephrology consulting. As per cards recs, has been c/w home labetalol. Continued HD in hospital (last HD 2/20). Overnight at ~3:15AM RN noticed that while standing to use the urinal, pt became unresponsive, was agonally breathing, and a code was called. Pulse was unable to be auscultated. 1 of epi administered, ROSC achieved. Pulse was lost, another epi administered. Bicarb x 1 and calcium x 1 also administered; IO placed. Pt intubated and started on levo gtt, administered 3.5 propofol and transferred to the MICU for further care.    ROS:  unable to assess 2/2 pt intubation, sedation    Allergies  kiwi (Swelling)  No Known Drug Allergies  Intolerances    PAST MEDICAL & SURGICAL HISTORY:  Mitral valve disorder  BPH (benign prostatic hypertrophy)  Osteoarthritis: left knee.  CKD (chronic kidney disease)  Aortic dissection  Pacemaker: 10/2010, BIV ICD upgrade in 9/2015  Renal Carcinoma  Intracranial Subdural Hematoma: 1991 - no deficits  Essential Hypertension  PHT (Pulmonary Hypertension)  CHF (Congestive Heart Failure)  Aortic dissection distal to left subclavian: s/p repair 2013  CRF (chronic renal failure): AV fistula right upper arm inserted in 2016 May  Chronic kidney disease (CKD): pt s/p Right AVF, s/p Right First stage basilic vein transposition  Chronic kidney disease (CKD): s/p Right AVF 2/16, 4/16  History of cystoscopy: cryoablation  of prostate 2014  Cataract, bilateral: right 3/2015, right  5/2015  Pacemaker: 2010 [employer] model #2210, with upgrade to AICD in 2015 [employer] model #ZA4826  Mitral valve replaced: Bioprosthetic - Dr. Roy 2010  S/P Craniotomy: 1991  S/P Nephrectomy: right partial nephrectomy 2006    FAMILY HISTORY:  Family history of aortic dissection (Sibling)  Family history of stroke (Sibling)  Family history of heart failure (Sibling)  Hypertension (Sibling)  Diabetes mellitus (Sibling)  Family history of CVA    SOCIAL HISTORY  OCCUPATION:  TOBACCO USE:  ALCOHOL USE:  RECREATIONAL DRUG USE:  HIGH RISK SEXUAL ACTIVITY:    MEDICATIONS:  aspirin  chewable 81 milliGRAM(s) Oral daily  labetalol 200 milliGRAM(s) Oral three times a day  piperacillin/tazobactam IVPB. 3.375 Gram(s) IV Intermittent every 12 hours  calcitriol   Capsule 0.25 MICROGram(s) Oral daily  epoetin shawna Injectable 4000 Unit(s) IV Push <User Schedule>  iron sucrose IVPB 100 milliGRAM(s) IV Intermittent <User Schedule>  multivitamin 1 Tablet(s) Oral daily    PHYSICAL EXAM:  T(C): 36.7 (02-20-18 @ 22:36), Max: 36.7 (02-20-18 @ 15:18)  HR: 60 (02-21-18 @ 04:12) (60 - 127)  BP: 93/57 (02-21-18 @ 04:12) (68/40 - 132/63)  RR: 14 (02-21-18 @ 04:12) (14 - 21)  SpO2: 100% (02-21-18 @ 04:12) (96% - 100%)  Wt(kg): --  Daily Height in cm: 165.1 (20 Feb 2018 20:45)    Daily   I&O's Summary    20 Feb 2018 07:01  -  21 Feb 2018 04:27  --------------------------------------------------------  IN: 800 mL / OUT: 800 mL / NET: 0 mL    Appearance: NAD	  HEENT:   Normal oral mucosa, PERRL, EOMI	  Lymphatic: No lymphadenopathy  Cardiovascular: Normal S1 S2, No JVD, No murmurs, No edema  Respiratory: Lungs clear to auscultation	  Psychiatry: A & O x 3, Mood & affect appropriate  Gastrointestinal:  Soft, Non-tender, + BS	  Skin: No rashes, No ecchymoses, No cyanosis	  Neurologic: Non-focal  MSK/Extremities: Normal range of motion, No clubbing, cyanosis or edema  Vascular: Peripheral pulses palpable 2+ bilaterally    LABS:	 	                        8.8    7.50  )-----------( 83       ( 20 Feb 2018 06:00 )             27.3     02-20    141  |  96<L>  |  39<H>  ----------------------------<  98  4.4   |  31  |  4.83<H>  02-20    138  |  93<L>  |  76<H>  ----------------------------<  99  6.4<HH>   |  27  |  8.43<H>    Ca    8.7      20 Feb 2018 20:50  Ca    9.0      20 Feb 2018 06:24  Phos  4.7     02-20  Mg     2.0     02-20    TPro  7.1  /  Alb  3.3  /  TBili  0.7  /  DBili  x   /  AST  98<H>  /  ALT  50<H>  /  AlkPhos  123<H>  02-19    CARDIAC MARKERS ( 20 Feb 2018 06:24 )  x     / 0.46 ng/mL / 24 u/L / 1.15 ng/mL / x      CARDIAC MARKERS ( 20 Feb 2018 02:30 )  x     / 0.45 ng/mL / 25 u/L / 1.05 ng/mL / x      CARDIAC MARKERS ( 19 Feb 2018 16:24 )  x     / 0.47 ng/mL / 23 u/L / 1.13 ng/mL / x          POCT Blood Glucose.: 81 mg/dL (21 Feb 2018 03:14)    BCX/UCX: BLOOD PERIPHERAL  02-20-18 --  --  BLOOD CULTURE PCR  	    ECG:  	    RADIOLOGY:	    1/15/18 TTE:  1. Bioprosthetic mitral valve replacement. Mild-moderate  mitral regurgitation. Mean transmitral valve gradient  equals 7 mm Hg, which is elevated even in the setting of a  prosthetic valve.  2. Calcified trileaflet aortic valve with normal opening.  Moderate-severe aortic regurgitation.  3. Severely dilated left atrium.  LA volume index = 49  cc/m2.  4. Moderate concentric left ventricular hypertrophy.  5. Normal left ventricular systolic function. No segmental  wall motion abnormalities.  6. Normal right atrium. A device wire is noted in the right  heart.  7. Normal right ventricular size with decreased right  ventricular systolic function.  8. Normal tricuspid valve.  Severe tricuspid regurgitation.  9. Estimated pulmonary artery systolic pressure equals 76  mm Hg, assuming right atrial pressure equals 10  mm Hg,  consistent with severe pulmonary hypertension.  *** Compared with echocardiogram of 3/3/2014, the aortic  regurgitation is now moderate to severe. There is severe  pulmonary hypertension.  EF: 76%    2/14/18 CTA C/A/P:  Status post thoracic-type B aortic dissection repair with residual   dissection extending from the diaphragmatic hiatus into the common iliac   arteries.   Dilatation proximal and distal abdominal aorta and proximal right common   iliac artery.  Thrombosis distal false lumen with decreased perfusion left external   iliac artery unchanged.  New right upper lobe opacity which may be infectious.  Signs of right-sided congestive heart failure.    Consult notes reviewed: MICU ACCEPT NOTE    CC: Patient is a 83y old  Male who presents with a chief complaint of pt states" I can't walk and I have pain all over the body since 3 days ago." (20 Feb 2018 20:45)    Providers:  Dr. Meliton Romano - Renal  Dr. Maury Marcelino- PMD  Dr. Sigifredo Cruz- Cardiology    83M with ESRD on HD (M,T,Th,F), CAD, MR s/p MVR (2010), PVD, +ICD, hx of type B aortic dissection s/p repair (2013), pulm HTN sent in from HD with pre-syncope and hypotension (SBP 70s). Recently admitted on 1/13-1/16/18 was found to have a hemoglobin of 7.8 received 3 unit of PRBC. TTE performed and compared with echocardiogram of 3/3/2014: the aortic regurgitation is now moderate to severe and severe pulmonary hypertension. 02/14/18 CT Chest/Abdomen/Pelvis: Status post thoracic-type B aortic dissection repair with residual dissection extending from the diaphragmatic hiatus into the common iliac arteries. Dilatation proximal and distal abdominal aorta and proximal right common iliac artery. Thrombosis distal false lumen with decreased perfusion left external iliac artery unchanged. New right upper lobe opacity which may be infectious. Signs of right-sided congestive heart failure.     In ED patient was given 250cc NS bolus, K found to be 6.1 not hemolyzed, given IV Calcium gluconate, Kayexalate. Admitted to medicine w/cardiology and nephrology consulting. As per cards recs, has been c/w home labetalol. Continued HD in hospital (last HD 2/20). Overnight at ~3:15AM RN noticed that while standing to use the urinal, pt became unresponsive, was agonally breathing, and a code was called. Pulse was unable to be auscultated. 1 of epi administered, ROSC achieved. Pulse was lost, another epi administered. Bicarb x 1 and calcium x 1 also administered; IO placed. Pt intubated and started on levo gtt, administered 3.5 propofol and transferred to the MICU for further care.    ROS:  unable to assess 2/2 pt intubation, sedation    Allergies  kiwi (Swelling)  No Known Drug Allergies  Intolerances    PAST MEDICAL & SURGICAL HISTORY:  Mitral valve disorder  BPH (benign prostatic hypertrophy)  Osteoarthritis: left knee.  CKD (chronic kidney disease)  Aortic dissection  Pacemaker: 10/2010, BIV ICD upgrade in 9/2015  Renal Carcinoma  Intracranial Subdural Hematoma: 1991 - no deficits  Essential Hypertension  PHT (Pulmonary Hypertension)  CHF (Congestive Heart Failure)  Aortic dissection distal to left subclavian: s/p repair 2013  CRF (chronic renal failure): AV fistula right upper arm inserted in 2016 May  Chronic kidney disease (CKD): pt s/p Right AVF, s/p Right First stage basilic vein transposition  Chronic kidney disease (CKD): s/p Right AVF 2/16, 4/16  History of cystoscopy: cryoablation  of prostate 2014  Cataract, bilateral: right 3/2015, right  5/2015  Pacemaker: 2010 [employer] model #2210, with upgrade to AICD in 2015 [employer] model #BN3451  Mitral valve replaced: Bioprosthetic - Dr. 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 3.375 Gram(s) IV Intermittent every 12 hours  calcitriol   Capsule 0.25 MICROGram(s) Oral daily  epoetin shawna Injectable 4000 Unit(s) IV Push <User Schedule>  iron sucrose IVPB 100 milliGRAM(s) IV Intermittent <User Schedule>  multivitamin 1 Tablet(s) Oral daily    PHYSICAL EXAM:  T(C): 36.7 (02-20-18 @ 22:36), Max: 36.7 (02-20-18 @ 15:18)  HR: 60 (02-21-18 @ 04:12) (60 - 127)  BP: 93/57 (02-21-18 @ 04:12) (68/40 - 132/63)  RR: 14 (02-21-18 @ 04:12) (14 - 21)  SpO2: 100% (02-21-18 @ 04:12) (96% - 100%)  Wt(kg): --  Daily Height in cm: 165.1 (20 Feb 2018 20:45)    Daily   I&O's Summary    20 Feb 2018 07:01  -  21 Feb 2018 04:27  --------------------------------------------------------  IN: 800 mL / OUT: 800 mL / NET: 0 mL    Appearance: NAD	  HEENT:   Normal oral mucosa, PERRL, EOMI	  Lymphatic: No lymphadenopathy  Cardiovascular: Normal S1 S2, No JVD, No murmurs, No edema  Respiratory: Lungs clear to auscultation	  Psychiatry: A & O x 3, Mood & affect appropriate  Gastrointestinal:  Soft, Non-tender, + BS	  Skin: No rashes, No ecchymoses, No cyanosis	  Neurologic: Non-focal  MSK/Extremities: Normal range of motion, No clubbing, cyanosis or edema  Vascular: Peripheral pulses palpable 2+ bilaterally    LABS:	 	                        8.8    7.50  )-----------( 83       ( 20 Feb 2018 06:00 )             27.3     02-20    141  |  96<L>  |  39<H>  ----------------------------<  98  4.4   |  31  |  4.83<H>  02-20    138  |  93<L>  |  76<H>  ----------------------------<  99  6.4<HH>   |  27  |  8.43<H>    Ca    8.7      20 Feb 2018 20:50  Ca    9.0      20 Feb 2018 06:24  Phos  4.7     02-20  Mg     2.0     02-20    TPro  7.1  /  Alb  3.3  /  TBili  0.7  /  DBili  x   /  AST  98<H>  /  ALT  50<H>  /  AlkPhos  123<H>  02-19    CARDIAC MARKERS ( 20 Feb 2018 06:24 )  x     / 0.46 ng/mL / 24 u/L / 1.15 ng/mL / x      CARDIAC MARKERS ( 20 Feb 2018 02:30 )  x     / 0.45 ng/mL / 25 u/L / 1.05 ng/mL / x      CARDIAC MARKERS ( 19 Feb 2018 16:24 )  x     / 0.47 ng/mL / 23 u/L / 1.13 ng/mL / x          POCT Blood Glucose.: 81 mg/dL (21 Feb 2018 03:14)    BCX/UCX: BLOOD PERIPHERAL  02-20-18 --  --  BLOOD CULTURE PCR  	    ECG:  	    RADIOLOGY:	    1/15/18 TTE:  1. Bioprosthetic mitral valve replacement. Mild-moderate  mitral regurgitation. Mean transmitral valve gradient  equals 7 mm Hg, which is elevated even in the setting of a  prosthetic valve.  2. Calcified trileaflet aortic valve with normal opening.  Moderate-severe aortic regurgitation.  3. Severely dilated left atrium.  LA volume index = 49  cc/m2.  4. Moderate concentric left ventricular hypertrophy.  5. Normal left ventricular systolic function. No segmental  wall motion abnormalities.  6. Normal right atrium. A device wire is noted in the right  heart.  7. Normal right ventricular size with decreased right  ventricular systolic function.  8. Normal tricuspid valve.  Severe tricuspid regurgitation.  9. Estimated pulmonary artery systolic pressure equals 76  mm Hg, assuming right atrial pressure equals 10  mm Hg,  consistent with severe pulmonary hypertension.  *** Compared with echocardiogram of 3/3/2014, the aortic  regurgitation is now moderate to severe. There is severe  pulmonary hypertension.  EF: 76%    2/14/18 CTA C/A/P:  Status post thoracic-type B aortic dissection repair with residual   dissection extending from the diaphragmatic hiatus into the common iliac   arteries.   Dilatation proximal and distal abdominal aorta and proximal right common   iliac artery.  Thrombosis distal false lumen with decreased perfusion left external   iliac artery unchanged.  New right upper lobe opacity which may be infectious.  Signs of right-sided congestive heart failure.    Consult notes reviewed: MICU ACCEPT NOTE    CC: Patient is a 83y old  Male who presents with a chief complaint of pt states" I can't walk and I have pain all over the body since 3 days ago." (20 Feb 2018 20:45)    Providers:  Dr. Meliton oRmano - Renal  Dr. Maury Marcelino- PMD  Dr. Sigifredo Cruz- Cardiology    83M with ESRD on HD (M,T,Th,F), CAD, MR s/p MVR (2010), PVD, +ICD, hx of type B aortic dissection s/p repair (2013), pulm HTN sent in from HD with pre-syncope and hypotension (SBP 70s). Recently admitted on 1/13-1/16/18 was found to have a hemoglobin of 7.8 received 3 unit of PRBC. TTE performed and compared with echocardiogram of 3/3/2014: the aortic regurgitation is now moderate to severe and severe pulmonary hypertension. 02/14/18 CT Chest/Abdomen/Pelvis: Status post thoracic-type B aortic dissection repair with residual dissection extending from the diaphragmatic hiatus into the common iliac arteries. Dilatation proximal and distal abdominal aorta and proximal right common iliac artery. Thrombosis distal false lumen with decreased perfusion left external iliac artery unchanged. New right upper lobe opacity which may be infectious. Signs of right-sided congestive heart failure.     In ED patient was given 250cc NS bolus, K found to be 6.1 not hemolyzed, given IV Calcium gluconate, Kayexalate. Admitted to medicine w/cardiology and nephrology consulting. As per cards recs, has been c/w home labetalol. Continued HD in hospital (last HD 2/20). Overnight at ~3:15AM RN noticed that while standing to use the urinal, pt became unresponsive, was agonally breathing, and a code was called. Pulse was unable to be auscultated. 1 of epi administered, ROSC achieved. Pulse was lost, another epi administered. Bicarb x 1 and calcium x 1 also administered; IO placed. Pt intubated and started on levo gtt, administered 3.5 propofol and transferred to the MICU for further care.    ROS:  unable to assess 2/2 pt intubation, sedation    Allergies  kiwi (Swelling)  No Known Drug Allergies  Intolerances    PAST MEDICAL & SURGICAL HISTORY:  Mitral valve disorder  BPH (benign prostatic hypertrophy)  Osteoarthritis: left knee.  CKD (chronic kidney disease)  Aortic dissection  Pacemaker: 10/2010, BIV ICD upgrade in 9/2015  Renal Carcinoma  Intracranial Subdural Hematoma: 1991 - no deficits  Essential Hypertension  PHT (Pulmonary Hypertension)  CHF (Congestive Heart Failure)  Aortic dissection distal to left subclavian: s/p repair 2013  CRF (chronic renal failure): AV fistula right upper arm inserted in 2016 May  Chronic kidney disease (CKD): pt s/p Right AVF, s/p Right First stage basilic vein transposition  Chronic kidney disease (CKD): s/p Right AVF 2/16, 4/16  History of cystoscopy: cryoablation  of prostate 2014  Cataract, bilateral: right 3/2015, right  5/2015  Pacemaker: 2010 [employer] model #2210, with upgrade to AICD in 2015 [employer] model #VF7445  Mitral valve replaced: Bioprosthetic - Dr. 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 3.375 Gram(s) IV Intermittent every 12 hours  calcitriol   Capsule 0.25 MICROGram(s) Oral daily  epoetin shawna Injectable 4000 Unit(s) IV Push <User Schedule>  iron sucrose IVPB 100 milliGRAM(s) IV Intermittent <User Schedule>  multivitamin 1 Tablet(s) Oral daily    PHYSICAL EXAM:  T(C): 36.7 (02-20-18 @ 22:36), Max: 36.7 (02-20-18 @ 15:18)  HR: 60 (02-21-18 @ 04:12) (60 - 127)  BP: 93/57 (02-21-18 @ 04:12) (68/40 - 132/63)  RR: 14 (02-21-18 @ 04:12) (14 - 21)  SpO2: 100% (02-21-18 @ 04:12) (96% - 100%)  Wt(kg): --  Daily Height in cm: 165.1 (20 Feb 2018 20:45)    Daily   I&O's Summary    20 Feb 2018 07:01  -  21 Feb 2018 04:27  --------------------------------------------------------  IN: 800 mL / OUT: 800 mL / NET: 0 mL    Appearance: elderly black male, obtunded, intubated	  HEENT:   Normal oral mucosa, pupils 3+  Lymphatic: No lymphadenopathy  Cardiovascular: + S1 S2, No JVD, No murmurs, No edema  Respiratory: Lungs clear to auscultation	+intubated  Psychiatry: sedated  Gastrointestinal:  Soft, mildly distended  Skin: No rashes, No ecchymoses, No cyanosis, +Left IO LE  Neurologic: Non-focal  Vascular: Peripheral pulses palpable 2+ bilaterally    LABS:	 	                        8.8    7.50  )-----------( 83       ( 20 Feb 2018 06:00 )             27.3     02-20    141  |  96<L>  |  39<H>  ----------------------------<  98  4.4   |  31  |  4.83<H>  02-20    138  |  93<L>  |  76<H>  ----------------------------<  99  6.4<HH>   |  27  |  8.43<H>    Ca    8.7      20 Feb 2018 20:50  Ca    9.0      20 Feb 2018 06:24  Phos  4.7     02-20  Mg     2.0     02-20    TPro  7.1  /  Alb  3.3  /  TBili  0.7  /  DBili  x   /  AST  98<H>  /  ALT  50<H>  /  AlkPhos  123<H>  02-19    CARDIAC MARKERS ( 20 Feb 2018 06:24 )  x     / 0.46 ng/mL / 24 u/L / 1.15 ng/mL / x      CARDIAC MARKERS ( 20 Feb 2018 02:30 )  x     / 0.45 ng/mL / 25 u/L / 1.05 ng/mL / x      CARDIAC MARKERS ( 19 Feb 2018 16:24 )  x     / 0.47 ng/mL / 23 u/L / 1.13 ng/mL / x          POCT Blood Glucose.: 81 mg/dL (21 Feb 2018 03:14)    BCX/UCX: BLOOD PERIPHERAL  02-20-18 --  --  BLOOD CULTURE PCR  	    ECG:  	    RADIOLOGY:	    1/15/18 TTE:  1. Bioprosthetic mitral valve replacement. Mild-moderate  mitral regurgitation. Mean transmitral valve gradient  equals 7 mm Hg, which is elevated even in the setting of a  prosthetic valve.  2. Calcified trileaflet aortic valve with normal opening.  Moderate-severe aortic regurgitation.  3. Severely dilated left atrium.  LA volume index = 49  cc/m2.  4. Moderate concentric left ventricular hypertrophy.  5. Normal left ventricular systolic function. No segmental  wall motion abnormalities.  6. Normal right atrium. A device wire is noted in the right  heart.  7. Normal right ventricular size with decreased right  ventricular systolic function.  8. Normal tricuspid valve.  Severe tricuspid regurgitation.  9. Estimated pulmonary artery systolic pressure equals 76  mm Hg, assuming right atrial pressure equals 10  mm Hg,  consistent with severe pulmonary hypertension.  *** Compared with echocardiogram of 3/3/2014, the aortic  regurgitation is now moderate to severe. There is severe  pulmonary hypertension.  EF: 76%    2/14/18 CTA C/A/P:  Status post thoracic-type B aortic dissection repair with residual   dissection extending from the diaphragmatic hiatus into the common iliac   arteries.   Dilatation proximal and distal abdominal aorta and proximal right common   iliac artery.  Thrombosis distal false lumen with decreased perfusion left external   iliac artery unchanged.  New right upper lobe opacity which may be infectious.  Signs of right-sided congestive heart failure.    Consult notes reviewed:

## 2018-02-22 LAB
ORGANISM # SPEC MICROSCOPIC CNT: SIGNIFICANT CHANGE UP

## 2018-02-23 LAB
ORGANISM # SPEC MICROSCOPIC CNT: SIGNIFICANT CHANGE UP

## 2018-02-25 LAB
-  CEFAZOLIN: SIGNIFICANT CHANGE UP
-  CIPROFLOXACIN: SIGNIFICANT CHANGE UP
-  CLINDAMYCIN: SIGNIFICANT CHANGE UP
-  COAGULASE NEGATIVE STAPHYLOCOCCUS: SIGNIFICANT CHANGE UP
-  ERYTHROMYCIN: SIGNIFICANT CHANGE UP
-  GENTAMICIN: SIGNIFICANT CHANGE UP
-  MOXIFLOXACIN(AEROBIC): SIGNIFICANT CHANGE UP
-  OXACILLIN: SIGNIFICANT CHANGE UP
-  RIFAMPIN.: SIGNIFICANT CHANGE UP
-  TETRACYCLINE: SIGNIFICANT CHANGE UP
-  TRIMETHOPRIM/SULFAMETHOXAZOLE: SIGNIFICANT CHANGE UP
-  VANCOMYCIN: SIGNIFICANT CHANGE UP
BACTERIA BLD CULT: SIGNIFICANT CHANGE UP
METHOD TYPE: SIGNIFICANT CHANGE UP

## 2018-03-08 ENCOUNTER — APPOINTMENT (OUTPATIENT)
Dept: CARDIOLOGY | Facility: CLINIC | Age: 83
End: 2018-03-08

## 2018-03-14 ENCOUNTER — APPOINTMENT (OUTPATIENT)
Age: 83
End: 2018-03-14

## 2018-04-05 ENCOUNTER — APPOINTMENT (OUTPATIENT)
Dept: VASCULAR SURGERY | Facility: CLINIC | Age: 83
End: 2018-04-05

## 2018-05-17 ENCOUNTER — APPOINTMENT (OUTPATIENT)
Dept: ELECTROPHYSIOLOGY | Facility: CLINIC | Age: 83
End: 2018-05-17

## 2018-06-07 ENCOUNTER — APPOINTMENT (OUTPATIENT)
Dept: CARDIOLOGY | Facility: CLINIC | Age: 83
End: 2018-06-07

## 2018-08-06 ENCOUNTER — APPOINTMENT (OUTPATIENT)
Dept: ELECTROPHYSIOLOGY | Facility: CLINIC | Age: 83
End: 2018-08-06

## 2018-12-27 NOTE — H&P CARDIOLOGY - SURGICAL SITE INCISION
no medically optimize the condition you have been diagnosed with acute mild compression fracture of L1 Vertebral Body , you have been conservatively treated with Brace , PT and pain control.  1- take medications as prescribed  2- follow up with Dr. Davis as out patient in 1-2 weeks 1- take medications as prescribed with the instructions.  2- follow up with appointment at [facility] regarding Liver transplant list enrollment
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
## 2019-03-07 NOTE — H&P PST ADULT - SCARS
Emelia Steele , SANDRA: please advise.
Kelin Dellakrystin is scheduled for autonomic testing on 3/19/19, can he hold his Trazodone for 2 days prior to testing?     Thank Silvia Mireles
Okay to hold Trazodone.
Routing to Dr. Keily Vasquez. Please see provider's note indicating that he may hold trazodone.
left abdominal; incision well healed ; right afv mild thrill mild bruit

## 2019-06-24 NOTE — DISCHARGE NOTE ADULT - NS DC INTERPRETER YES NO
Pt notified om 6-21.  Info sent to [facility] to schedule.  Pt ok with going to [place] or [place].     No

## 2020-07-13 NOTE — H&P ADULT - ASSESSMENT
82 y/o Male with PMHx of Type B aortic dissection s/p repair in 2013 at [place], ESRD, anemia on HD at home M,T,Th,F, pulmonary hypertension, h/o AICD, h/o MVR in 2010 p/w BIBA from dialysis with near syncope and hypotension to SBP of 70s per ED note with confusion post event. Admitted to telemetry for syncope and hyperkalemia. 13-Jul-2020

## 2020-10-06 NOTE — H&P PST ADULT - NEGATIVE GASTROINTESTINAL SYMPTOMS
morphine 4 mg , versed 4 mg, etomidate 20 mg
etomidate, rocuronium, propofol
no constipation/no nausea/no vomiting/no diarrhea

## 2021-03-05 NOTE — ASU PATIENT PROFILE, ADULT - PATIENT KNOW
Follow up with Pulmonologist 3-5 days.    Follow up with Primary Physician 3-5 days.    Follow up with Nephrologist 3-5 days.       Splint yes

## 2021-03-25 NOTE — PROGRESS NOTE ADULT - ASSESSMENT
Patient is an 82 yoMale with PMH of Type B aortic dissection s/p repair in 2013 at [place], ESRD on HD at home M,T,Th,F, pulmonary hypertension, h/o AICD, h/o MVR in 2010 sent to ED after he was found to have a low hemoglobin      ·  Problem: Anemia in chronic kidney disease, on chronic dialysis.     s/p 2u pRBCs with HD  Monitor h/H  no sign of blood loss  Hb stable    ·  Problem: ESRD (end stage renal disease).  Plan: HD as per renal        ·  Problem: PHT (Pulmonary Hypertension).    Plan: repeat echo.  Monitor volume status        ·  Problem: Acute on chronic diastolic congestive heart failure.  Plan: repeat echo reviewed  Cards appreciated  d/c home today    ·  Problem: Essential Hypertension.  Plan: c/w labetalol    PPX - PAS, encourage PO intake, PT
82 year old male with dCHF, HTN, renal carcinoma, ESRD, PPM and BPH p/w anemia.    - ESRD: Home dialysis. M/T/TH/F  - Anemia: Hgb improved s/p 2 units PRBC  -Severe AR    Recommendations  - HD today and remove fluid as BP allows  - trend H/H;  Epogen at HD  - DW Dr Cruz re echo findings and he will see as an outpt    DC home
82 year old male with dCHF, HTN, renal carcinoma, ESRD, PPM and BPH p/w anemia.    - ESRD: Home dialysis. M/T/TH/F  - Anemia: Hgb improved s/p 2 units PRBC  -Severe AR    Recommendations  - HD today and remove fluid as BP allows  - trend H/H;  Will see if I could give blood during HD today.  Otherwise type and screen  - Epogen with HD tomorrow  -Cards eval noted
Patient is an 82 yoMale with PMH of Type B aortic dissection s/p repair in 2013 at [place], ESRD on HD at home M,T,Th,F, pulmonary hypertension, h/o AICD, h/o MVR in 2010 sent to ED after he was found to have a low hemoglobin      ·  Problem: Anemia in chronic kidney disease, on chronic dialysis.     Patient to [id number] pRBCs with HD  Monitor h/H  No signs of blood loss or hemolysis.       ·  Problem: ESRD (end stage renal disease).  Plan: HD as per renal        ·  Problem: PHT (Pulmonary Hypertension).  Plan: repeat echo.  Monitor volume status        ·  Problem: Acute on chronic diastolic congestive heart failure.  Plan: repeat echo  Cards to see      ·  Problem: Essential Hypertension.  Plan: c/w labetalol    PPX - PAS, encourage PO intake, PT
Patient is an 82 yoMale with PMH of Type B aortic dissection s/p repair in 2013 at [place], ESRD on HD at home M,T,Th,F, pulmonary hypertension, h/o AICD, h/o MVR in 2010 sent to ED after he was found to have a low hemoglobin      ·  Problem: Anemia in chronic kidney disease, on chronic dialysis.     s/p 2u pRBCs with HD  Monitor h/H  no sign of blood loss      ·  Problem: ESRD (end stage renal disease).  Plan: HD as per renal        ·  Problem: PHT (Pulmonary Hypertension).    Plan: repeat echo.  Monitor volume status        ·  Problem: Acute on chronic diastolic congestive heart failure.  Plan: repeat echo  Cards f/u       ·  Problem: Essential Hypertension.  Plan: c/w labetalol    PPX - PAS, encourage PO intake, PT
Patient is an 82 yoMale with PMH of Type B aortic dissection s/p repair in 2013 at [place], ESRD on HD at home M,T,Th,F, pulmonary hypertension, h/o AICD, h/o MVR in 2010 sent to ED after he was found to have a low hemoglobin      ·  Problem: Anemia in chronic kidney disease, on chronic dialysis.     s/p 2u pRBCs with HD  Monitor h/H  no sign of blood loss  Hb stable    ·  Problem: ESRD (end stage renal disease).  Plan: HD as per renal        ·  Problem: PHT (Pulmonary Hypertension).    Plan: repeat echo.  Monitor volume status        ·  Problem: Acute on chronic diastolic congestive heart failure.  Plan: repeat echo reviewed  Cards f/u   d/c home if no objection from Cardio    ·  Problem: Essential Hypertension.  Plan: c/w labetalol    PPX - PAS, encourage PO intake, PT
82 year old male with dCHF, HTN, renal carcinoma, ESRD, PPM and BPH p/w anemia.    - ESRD: Home dialysis. M/T/TH/F  - Anemia: Hgb improved s/p 2 units PRBC    Recommendations  - HD tomorrow  - trend H/H  - Epogen with HD tomorrow    Wife updated at bedside
Previously Declined (within the last year)

## 2021-10-20 NOTE — ED PROVIDER NOTE - QUALITY
Const: Severe EtOH intox, poor hygiene, unkempt  ENT: Airway patent, protecting airway. Nasal mucosa clear. MMM. No scalp hematoma and no apparent c-spine tenderness.  Eyes: Clear bilaterally, pupils equal, round 3mm  Cardiac: Normal rate, regular rhythm.  Heart sounds S1, S2.  No murmurs, rubs or gallops.  Resp: Breath sounds clear and equal bilaterally.  GI: Abdomen soft, appears non-tender, no guarding.  MSK: No signs of acute trauma or injury.   Neuro: Severe EtOH intox, LOCKETT, normal tone, slurred speech, answers simple questions.  Skin: No signs of acute trauma or injury.   Psych: Severe EtOH intox, mostly cooperative tearing No

## 2021-11-16 NOTE — ED ADULT TRIAGE NOTE - HEART RATE (BEATS/MIN)
11/16/21 1304   Events/Summary/Plan   Events/Summary/Plan IS/PEP done pt reached 2250 60% of predicted   Vital Signs   Pulse 82   Respiration 18   Pulse Oximetry 94 %   $ Pulse Oximetry (Spot Check) Yes   Respiratory Assessment   Respiratory Pattern Within Normal Limits   Level of Consciousness Alert   Chest Exam   Work Of Breathing / Effort Within Normal Limits   Oxygen   O2 (LPM) 8   O2 Delivery Device Oxymask     
105

## 2022-01-01 NOTE — PATIENT PROFILE ADULT. - ABILITY TO HEAR (WITH HEARING AID OR HEARING APPLIANCE IF NORMALLY USED):
Mother very motivated to pump every 3 hours and continue doing so at home.  Mother states has not seen output yet.  Checked flange size during session, reviewed pump use and provided encouragement.  Instructed mother to call for assistance,questions or concerns.     Adequate: hears normal conversation without difficulty

## 2022-12-29 NOTE — H&P PST ADULT - PROBLEM SELECTOR PROBLEM 1
ED Attending Physician Note      Patient : Case Andrews Age: 51 year old Sex: male   MRN: [id number] Encounter Date: 12/28/2022      History     Chief Complaint   Patient presents with   • Motor Vehicle Crash     HPI: 51 year old male presents with MVC. Pt states he lost control of his vehicle, on the highway, 70mph. Restrained  with airbag deployment. Believes he spun and hit the median. Does not believe he had LOC. Currently with pain left shoulder. Not sure if he hit his head.     No Known Allergies    No past medical history on file.    No past surgical history on file.    No family history on file.       PMHx: HTN, DM  PSHx: Denies  Shx: Smoker      Review of symptoms:  General: No fever  Skin: No rash  Eye: No recent vision problems  ENMT: No sore throat  Respiratory: No shortness of breath  CV: No chest pain  GI: No abdominal pain  : No dysuria  MSK: No joint pain  Neurologic: No headache but \"feels warm\"      Physical Exam     ED Triage Vitals [12/28/22 1830]   ED Triage Vitals Group      Temp 98.9 °F (37.2 °C)      Heart Rate 98      Resp 16      BP (!) 181/115      SpO2 98 %      EtCO2 mmHg       Height 5' 11\" (1.803 m)      Weight 260 lb (117.9 kg)      Weight Scale Used       BMI (Calculated) 36.26      IBW/kg (Calculated) 75.3       Pulse Ox is >95% which is normal for this patient  General: Alert, no acute distress  Skin: Warm, dry, light seatbelt sign noted on chest and lower abdomen.   Head: Normocephalic atraumatic  Eye: PERRL, EOMI  ENMT: Oral mucosa moist  Neck: Supple, trachea midline  Cardiovascular: Regular rate, rhythm, S1, S2  Respiratory: Lungs CTA, non-labored respirations, symmetrical expansion  GI: Soft, nontender, nondistended  MSK: Normal ROM, no swelling, no deformity. ? cspine tenderness prior to arrival, Currently no CTLS tenderness. Pelvis stable. Left lateral/superior shoulder tenderness. Limited ROM. Distal neurovasc intact.    Neuro: Alert, oriented, no focal neuro  Shingles rash Goals of care, counseling/discussion Dysphagia, pharyngeal Dysphagia, pharyngeal Dysphagia, pharyngeal Dysphagia, pharyngeal Dysphagia, pharyngeal Dysphagia, pharyngeal Dysphagia, pharyngeal Dysphagia, pharyngeal Dysphagia, pharyngeal Dysphagia, pharyngeal Goals of care, counseling/discussion Shingles rash Urinary retention Urinary retention Urinary retention Urinary retention CKD (chronic kidney disease)

## 2023-08-03 NOTE — ED ADULT TRIAGE NOTE - RESPIRATORY RATE (BREATHS/MIN)
Patient Instructions from Today's Visit    Reason for Visit:  Follow Up    Diagnosis Information:  [email or web address]    Plan:   Lab work looks stable today  Your Iron Level should come up since your Hemoglobin is up now    Follow Up: We will bring you back in November for a follow up with  and lab work prior.     Recent Lab Results:  Hospital Outpatient Visit on 08/03/2023   Component Date Value Ref Range Status    WBC 08/03/2023 7.1  4.3 - 11.1 K/uL Final    RBC 08/03/2023 4.39  4.23 - 5.6 M/uL Final    Hemoglobin 08/03/2023 12.3 (L)  13.6 - 17.2 g/dL Final    Hematocrit 08/03/2023 38.4 (L)  41.1 - 50.3 % Final    MCV 08/03/2023 87.5  82.0 - 102.0 FL Final    MCH 08/03/2023 28.0  26.1 - 32.9 PG Final    MCHC 08/03/2023 32.0  31.4 - 35.0 g/dL Final    RDW 08/03/2023 19.3 (H)  11.9 - 14.6 % Final    Platelets 66/83/1644 199  150 - 450 K/uL Final    MPV 08/03/2023 10.1  9.4 - 12.3 FL Final    nRBC 08/03/2023 0.00  0.0 - 0.2 K/uL Final    **Note: Absolute NRBC parameter is now reported with Hemogram**    Neutrophils % 08/03/2023 74  43 - 78 % Final    Lymphocytes % 08/03/2023 17  13 - 44 % Final    Monocytes % 08/03/2023 7  4.0 - 12.0 % Final    Eosinophils % 08/03/2023 2  0.5 - 7.8 % Final    Basophils % 08/03/2023 0  0.0 - 2.0 % Final    Immature Granulocytes 08/03/2023 0  0.0 - 5.0 % Final    Neutrophils Absolute 08/03/2023 5.2  1.7 - 8.2 K/UL Final    Lymphocytes Absolute 08/03/2023 1.2  0.5 - 4.6 K/UL Final    Monocytes Absolute 08/03/2023 0.5  0.1 - 1.3 K/UL Final    Eosinophils Absolute 08/03/2023 0.2  0.0 - 0.8 K/UL Final    Basophils Absolute 08/03/2023 0.0  0.0 - 0.2 K/UL Final    Absolute Immature Granulocyte 08/03/2023 0.0  0.0 - 0.5 K/UL Final    Differential Type 08/03/2023 AUTOMATED    Final    Sodium 08/03/2023 135  133 - 143 mmol/L Final    Potassium 08/03/2023 4.1  3.5 - 5.1 mmol/L Final    Chloride 08/03/2023 105
18
